# Patient Record
Sex: FEMALE | Race: WHITE | NOT HISPANIC OR LATINO | Employment: FULL TIME | URBAN - METROPOLITAN AREA
[De-identification: names, ages, dates, MRNs, and addresses within clinical notes are randomized per-mention and may not be internally consistent; named-entity substitution may affect disease eponyms.]

---

## 2017-02-26 ENCOUNTER — TRANSFERRED RECORDS (OUTPATIENT)
Dept: HEALTH INFORMATION MANAGEMENT | Facility: CLINIC | Age: 42
End: 2017-02-26

## 2017-02-28 ENCOUNTER — TELEPHONE (OUTPATIENT)
Dept: PEDIATRICS | Facility: CLINIC | Age: 42
End: 2017-02-28

## 2017-02-28 NOTE — TELEPHONE ENCOUNTER
Please call patient back, regarding her UC visit on 2/26/17 she need to talk to nurse about her meds and current condition. Please back at [phone].      Thank you,    Anjali Salas

## 2017-02-28 NOTE — TELEPHONE ENCOUNTER
Patient was seen in the urgency room on Sunday-has a swollen gland on left jaw.  Began antibiotic-Clindamycin 300 mg four times daily for 7 days- on Sunday.  When she eats, the swelling in the gland increases, and then reduces after eating, or with massaging.  Discomfort with swallowing.  Gland is tender to the touch.  Wondering if antibiotic treatment is appropriate?  MURRAY Tamez RN

## 2017-02-28 NOTE — TELEPHONE ENCOUNTER
Patient states understanding. She will call back if worsening or change in symptoms or no improvement.  Marita Darby RN

## 2017-02-28 NOTE — TELEPHONE ENCOUNTER
It may be that this is inflammation of the parotid (salivary) gland or a parotid stone.  Clindamycin is a reasonable abx treatment for infection.  I would say if she is not better by day 5-7 of abx or is getting worse- fevers, chills, swelling in other locations- then she would need to be seen.   Yvrose Cool MD

## 2017-09-03 ENCOUNTER — HEALTH MAINTENANCE LETTER (OUTPATIENT)
Age: 42
End: 2017-09-03

## 2017-09-21 ENCOUNTER — TRANSFERRED RECORDS (OUTPATIENT)
Dept: HEALTH INFORMATION MANAGEMENT | Facility: CLINIC | Age: 42
End: 2017-09-21

## 2018-01-24 ENCOUNTER — OFFICE VISIT (OUTPATIENT)
Dept: PEDIATRICS | Facility: CLINIC | Age: 43
End: 2018-01-24
Payer: COMMERCIAL

## 2018-01-24 VITALS
DIASTOLIC BLOOD PRESSURE: 74 MMHG | WEIGHT: 164.5 LBS | TEMPERATURE: 98.4 F | OXYGEN SATURATION: 99 % | SYSTOLIC BLOOD PRESSURE: 112 MMHG | HEIGHT: 61 IN | HEART RATE: 88 BPM | BODY MASS INDEX: 31.06 KG/M2

## 2018-01-24 DIAGNOSIS — Z13.6 CARDIOVASCULAR SCREENING; LDL GOAL LESS THAN 160: ICD-10-CM

## 2018-01-24 DIAGNOSIS — Z01.419 ENCOUNTER FOR GYNECOLOGICAL EXAMINATION WITHOUT ABNORMAL FINDING: Primary | ICD-10-CM

## 2018-01-24 DIAGNOSIS — J45.20 INTERMITTENT ASTHMA, UNCOMPLICATED: ICD-10-CM

## 2018-01-24 PROCEDURE — G0145 SCR C/V CYTO,THINLAYER,RESCR: HCPCS | Performed by: INTERNAL MEDICINE

## 2018-01-24 PROCEDURE — 99396 PREV VISIT EST AGE 40-64: CPT | Performed by: INTERNAL MEDICINE

## 2018-01-24 PROCEDURE — 87624 HPV HI-RISK TYP POOLED RSLT: CPT | Performed by: INTERNAL MEDICINE

## 2018-01-24 RX ORDER — ALBUTEROL SULFATE 90 UG/1
2 AEROSOL, METERED RESPIRATORY (INHALATION) EVERY 6 HOURS
Qty: 3 INHALER | Refills: 1 | Status: SHIPPED | OUTPATIENT
Start: 2018-01-24 | End: 2020-01-24

## 2018-01-24 NOTE — MR AVS SNAPSHOT
After Visit Summary   1/24/2018    Genny Reyes    MRN: [id number]           Patient Information     Date Of Birth          1975        Visit Information        Provider Department      1/24/2018 8:20 AM Yvrose Cool MD [facility] [place]        Today's Diagnoses     Encounter for gynecological examination without abnormal finding    -  1    Intermittent asthma, uncomplicated        CARDIOVASCULAR SCREENING; LDL GOAL LESS THAN 160          Care Instructions    Email/drop off health screening numbers and I will decide if we should do further testing.     Work on watching diet. Eat a healthy, balanced diet that consists of 1/2 plate of fruits and vegetables, 1/4 plate complex carbohydrate and 1/4 plate lean protein.     Follow up for annual care or as needed       Preventive Health Recommendations  Female Ages 40 to 49    Yearly exam:     See your health care provider every year in order to  1. Review health changes.   2. Discuss preventive care.    3. Review your medicines if your doctor prescribed any.      Get a Pap test every three years (unless you have an abnormal result and your provider advises testing more often).      If you get Pap tests with HPV test, you only need to test every 5 years, unless you have an abnormal result. You do not need a Pap test if your uterus was removed (hysterectomy) and you have not had cancer.      You should be tested each year for STDs (sexually transmitted diseases), if you're at risk.       Ask your doctor if you should have a mammogram.      Have a colonoscopy (test for colon cancer) if someone in your family has had colon cancer or polyps before age 50.       Have a cholesterol test every 5 years.       Have a diabetes test (fasting glucose) after age 45. If you are at risk for diabetes, you should have this test every 3 years.    Shots: Get a flu shot each year. Get a tetanus shot every 10 years.     Nutrition:     Eat at least 5  servings of fruits and vegetables each day.    Eat whole-grain bread, whole-wheat pasta and brown rice instead of white grains and rice.    Talk to your provider about Calcium and Vitamin D.     Lifestyle    Exercise at least 150 minutes a week (an average of 30 minutes a day, 5 days a week). This will help you control your weight and prevent disease.    Limit alcohol to one drink per day.    No smoking.     Wear sunscreen to prevent skin cancer.    See your dentist every six months for an exam and cleaning.          Follow-ups after your visit        Who to contact     If you have questions or need follow up information about today's clinic visit or your schedule please contact [facility] directly at [phone].  Normal or non-critical lab and imaging results will be communicated to you by [employer], letter or phone within 4 business days after the clinic has received the results. If you do not hear from us within 7 days, please contact the clinic through [employer] or phone. If you have a critical or abnormal lab result, we will notify you by phone as soon as possible.  Submit refill requests through [employer] or call your pharmacy and they will forward the refill request to us. Please allow 3 business days for your refill to be completed.          Additional Information About Your Visit        [employer] Information     [employer] gives you secure access to your electronic health record. If you see a primary care provider, you can also send messages to your care team and make appointments. If you have questions, please call your primary care clinic.  If you do not have a primary care provider, please call [phone] and they will assist you.        Care EveryWhere ID     This is your Care EveryWhere ID. This could be used by other organizations to access your [place] medical records  [id number]        Your Vitals Were     Pulse Temperature Height Pulse Oximetry BMI (Body Mass Index)       88 98.4  F (36.9  " C) (Oral) 5' 0.75\" (1.543 m) 99% 31.34 kg/m2        Blood Pressure from Last 3 Encounters:   01/24/18 112/74   06/16/16 112/76   03/02/16 100/72    Weight from Last 3 Encounters:   01/24/18 164 lb 8 oz (74.6 kg)   06/16/16 152 lb 4 oz (69.1 kg)   03/02/16 155 lb 7 oz (70.5 kg)              We Performed the Following     Asthma Action Plan (AAP)     HPV High Risk Types DNA Cervical     Pap imaged thin layer screen with HPV - recommended age 30 - 65          Where to get your medicines      These medications were sent to [facility]/pharmacy #[id number] - [place], [place] - [address] AT [place] [address], [place] [address]     Phone:  [phone]     albuterol 108 (90 BASE) MCG/ACT Inhaler          Primary Care Provider Office Phone # Fax #    Yvrose Cool [phone]       [facility] [address] [place] [address]        Equal Access to Services     [facility]: Hadii francisco j post Sojavier, waaxda luyvrose, qaybta kacey aguirre, geronimo levine . So [facility] [phone].    ATENCIÓN: Si habla español, tiene a yates disposición servicios gratuitos de asistencia lingüística. [facility] [phone].    We comply with applicable federal civil rights laws and [place] laws. We do not discriminate on the basis of race, color, national origin, age, disability, sex, sexual orientation, or gender identity.            Thank you!     Thank you for choosing [facility]  for your care. Our goal is always to provide you with excellent care. Hearing back from our patients is one way we can continue to improve our services. Please take a few minutes to complete the written survey that you may receive in the mail after your visit with us. Thank you!             Your Updated Medication List - Protect others around you: Learn how to safely use, store and throw away your medicines at www.disposemymeds.org.          This list is accurate as " of 1/24/18  9:10 AM.  Always use your most recent med list.                   Brand Name Dispense Instructions for use Diagnosis    albuterol 108 (90 BASE) MCG/ACT Inhaler    PROAIR HFA/PROVENTIL HFA/VENTOLIN HFA    3 Inhaler    Inhale 2 puffs into the lungs every 6 hours    Intermittent asthma, uncomplicated       E-Z SPACER Amber     1 Device    Use with inhaler.    Intermittent asthma       fluticasone 50 MCG/ACT spray    FLONASE    48 g    Spray 1-2 sprays into both nostrils daily    Environmental allergies       levonorgestrel 20 MCG/24HR IUD    MIRENA    1 each    1 each (20 mcg) by Intrauterine route once for 1 dose    Encounter for IUD insertion       ZYRTEC 10 MG tablet   Generic drug:  cetirizine      1 TABLET DAILY

## 2018-01-24 NOTE — NURSING NOTE
"Chief Complaint   Patient presents with     Physical       Initial /74 (BP Location: Right arm, Patient Position: Chair, Cuff Size: Adult Regular)  Pulse 88  Temp 98.4  F (36.9  C) (Oral)  Ht 5' 0.75\" (1.543 m)  Wt 164 lb 8 oz (74.6 kg)  SpO2 99%  BMI 31.34 kg/m2 Estimated body mass index is 31.34 kg/(m^2) as calculated from the following:    Height as of this encounter: 5' 0.75\" (1.543 m).    Weight as of this encounter: 164 lb 8 oz (74.6 kg).  Medication Reconciliation: complete   Beronica Juarez MA 8:49 AM 1/24/2018     "

## 2018-01-24 NOTE — PATIENT INSTRUCTIONS
Email/drop off health screening numbers and I will decide if we should do further testing.     Work on watching diet. Eat a healthy, balanced diet that consists of 1/2 plate of fruits and vegetables, 1/4 plate complex carbohydrate and 1/4 plate lean protein.     Follow up for annual care or as needed       Preventive Health Recommendations  Female Ages 40 to 49    Yearly exam:     See your health care provider every year in order to  1. Review health changes.   2. Discuss preventive care.    3. Review your medicines if your doctor prescribed any.      Get a Pap test every three years (unless you have an abnormal result and your provider advises testing more often).      If you get Pap tests with HPV test, you only need to test every 5 years, unless you have an abnormal result. You do not need a Pap test if your uterus was removed (hysterectomy) and you have not had cancer.      You should be tested each year for STDs (sexually transmitted diseases), if you're at risk.       Ask your doctor if you should have a mammogram.      Have a colonoscopy (test for colon cancer) if someone in your family has had colon cancer or polyps before age 50.       Have a cholesterol test every 5 years.       Have a diabetes test (fasting glucose) after age 45. If you are at risk for diabetes, you should have this test every 3 years.    Shots: Get a flu shot each year. Get a tetanus shot every 10 years.     Nutrition:     Eat at least 5 servings of fruits and vegetables each day.    Eat whole-grain bread, whole-wheat pasta and brown rice instead of white grains and rice.    Talk to your provider about Calcium and Vitamin D.     Lifestyle    Exercise at least 150 minutes a week (an average of 30 minutes a day, 5 days a week). This will help you control your weight and prevent disease.    Limit alcohol to one drink per day.    No smoking.     Wear sunscreen to prevent skin cancer.    See your dentist every six months for an exam and  cleaning.

## 2018-01-24 NOTE — PROGRESS NOTES
"   SUBJECTIVE:   CC: Genny Reyes is an 42 year old woman who presents for preventive health visit.     Genny presents to the clinic for her annual physical examination. Patient reports asthma is controlled; she uses albuterol inhaler \"almost never\". Notes only one asthma attack in the past year. Notes having blood work done for pj screening at work. BP in clinic was 112/74; weight was 164 lbs. Patient reports taking a J-term class and has been stressed and not exercising.       Physical   Annual:     Getting at least 3 servings of Calcium per day::  Yes    Bi-annual eye exam::  Yes    Dental care twice a year::  Yes    Sleep apnea or symptoms of sleep apnea::  Excessive snoring    Diet::  Low fat/cholesterol    Frequency of exercise::  4-5 days/week    Duration of exercise::  30-45 minutes    Taking medications regularly::  Yes    Medication side effects::  Not applicable    Additional concerns today::  No                    Today's PHQ-2 Score:   PHQ-2 ( 1999 Pfizer) 1/21/2018   Q1: Little interest or pleasure in doing things 0   Q2: Feeling down, depressed or hopeless 0   PHQ-2 Score 0   Q1: Little interest or pleasure in doing things Not at all   Q2: Feeling down, depressed or hopeless Not at all   PHQ-2 Score 0       Abuse: Current or Past(Physical, Sexual or Emotional)- No  Do you feel safe in your environment - Yes    Social History   Substance Use Topics     Smoking status: Never Smoker     Smokeless tobacco: Never Used     Alcohol use Yes      Comment: 1 drink per month     Alcohol Use 1/21/2018   If you drink alcohol, do you typically have greater than 3 drinks per day OR greater than 7 drinks per week?   No   No flowsheet data found.    Reviewed orders with patient.  Reviewed health maintenance and updated orders accordingly - Yes  Labs reviewed in EPIC  BP Readings from Last 3 Encounters:   01/24/18 112/74   06/16/16 112/76   03/02/16 100/72    Wt Readings from Last 3 Encounters:   01/24/18 74.6 " kg (164 lb 8 oz)   16 69.1 kg (152 lb 4 oz)   16 70.5 kg (155 lb 7 oz)                  Patient Active Problem List   Diagnosis     CARDIOVASCULAR SCREENING; LDL GOAL LESS THAN 160     Dysplastic nevus     Encounter for IUD insertion     Intermittent asthma, uncomplicated     Past Surgical History:   Procedure Laterality Date     CHOLECYSTECTOMY, LAPOROSCOPIC  2009     SURGICAL HISTORY OF -   ,            Social History   Substance Use Topics     Smoking status: Never Smoker     Smokeless tobacco: Never Used     Alcohol use Yes      Comment: 1 drink per month     Family History   Problem Relation Age of Onset     Lipids Mother      Lipids Father      skin cancer     Coronary Artery Disease Paternal Grandfather      5 heart attacks         Current Outpatient Prescriptions   Medication Sig Dispense Refill     albuterol (PROAIR HFA, PROVENTIL HFA, VENTOLIN HFA) 108 (90 BASE) MCG/ACT inhaler Inhale 2 puffs into the lungs every 6 hours 3 Inhaler 1     fluticasone (FLONASE) 50 MCG/ACT nasal spray Spray 1-2 sprays into both nostrils daily 48 g 3     Spacer/Aero-Holding Chambers (E-Z SPACER) BLESSING Use with inhaler. 1 Device 0     ZYRTEC 10 MG OR TABS 1 TABLET DAILY       levonorgestrel (MIRENA) 20 MCG/24HR IUD 1 each (20 mcg) by Intrauterine route once for 1 dose 1 each 0     Allergies   Allergen Reactions     Cats      Kiwi      Penicillins      Pomegranate Extract      fruit     Seasonal Allergies        Patient under age 50, mutual decision reflected in health maintenance.      Pertinent mammograms are reviewed under the imaging tab.  History of abnormal Pap smear:   Last 3 Pap Results:   PAP (no units)   Date Value   2013 NIL   2010 NIL       Reviewed and updated as needed this visit by clinical staff         Reviewed and updated as needed this visit by Provider            Review of Systems  C: NEGATIVE for fever, chills, change in weight  I: NEGATIVE for worrisome rashes, moles  "or lesions  E: NEGATIVE for vision changes or irritation  ENT: NEGATIVE for ear, mouth and throat problems  R: NEGATIVE for significant cough or SOB  B: NEGATIVE for masses, tenderness or discharge  CV: NEGATIVE for chest pain, palpitations or peripheral edema  GI: NEGATIVE for nausea, abdominal pain, heartburn, or change in bowel habits  : NEGATIVE for unusual urinary or vaginal symptoms. Periods are regular.  M: NEGATIVE for significant arthralgias or myalgia  N: NEGATIVE for weakness, dizziness or paresthesias  P: NEGATIVE for changes in mood or affect     This document serves as a record of the services and decisions personally performed and made by Yvrose Cool MD. It was created on her behalf by Sofía Casarez, a trained medical scribe. The creation of this document is based the provider's statements to the medical scribe.    Sofía Casarez January 24, 2018 9:04 AM  OBJECTIVE:   /74 (BP Location: Right arm, Patient Position: Chair, Cuff Size: Adult Regular)  Pulse 88  Temp 98.4  F (36.9  C) (Oral)  Ht 5' 0.75\" (1.543 m)  Wt 164 lb 8 oz (74.6 kg)  SpO2 99%  BMI 31.34 kg/m2  Physical Exam  GENERAL: healthy, alert and no distress  EYES: Eyes grossly normal to inspection, PERRL and conjunctivae and sclerae normal  HENT: ear canals and TM's normal, nose and mouth without ulcers or lesions  NECK: no adenopathy, no asymmetry, masses, or scars and thyroid normal to palpation  RESP: lungs clear to auscultation - no rales, rhonchi or wheezes  BREAST: normal without masses, tenderness or nipple discharge and no palpable axillary masses or adenopathy  CV: regular rate and rhythm, normal S1 S2, no S3 or S4, no murmur, click or rub, no peripheral edema   ABDOMEN: soft, nontender, no hepatosplenomegaly, no masses and bowel sounds normal   (female): normal female external genitalia, normal urethral meatus, vaginal mucosa pink, moist, well rugated, and normal cervix/adnexa/uterus without masses or " "discharge  MS: no gross musculoskeletal defects noted, no edema  SKIN: no suspicious lesions or rashes  NEURO: Normal strength and tone, mentation intact and speech normal  PSYCH: mentation appears normal, affect normal/bright    ASSESSMENT/PLAN:   (Z01.419) Encounter for gynecological examination without abnormal finding  (primary encounter diagnosis)  -- immunizations utd   -- ACT today   -- pap today   -- discussed healthy diet and exercise; guidelines provided       (J45.20) Intermittent asthma, uncomplicated  -- asthma well controlled   -- continue with current care plan   Plan: albuterol (PROAIR HFA/PROVENTIL HFA/VENTOLIN         HFA) 108 (90 BASE) MCG/ACT Inhaler      (Z13.6) CARDIOVASCULAR SCREENING; LDL GOAL LESS THAN 160  -- patient to send me health screening numbers; will determine blood work after     Follow up for annual care or as needed     COUNSELING:  Reviewed preventive health counseling, as reflected in patient instructions       Regular exercise       Healthy diet/nutrition         reports that she has never smoked. She has never used smokeless tobacco.    Estimated body mass index is 28.53 kg/(m^2) as calculated from the following:    Height as of 6/16/16: 5' 1.25\" (1.556 m).    Weight as of 6/16/16: 152 lb 4 oz (69.1 kg).   Weight management plan: Discussed healthy diet and exercise guidelines and patient will follow up in 12 months in clinic to re-evaluate.    Counseling Resources:  ATP IV Guidelines  Pooled Cohorts Equation Calculator  Breast Cancer Risk Calculator  FRAX Risk Assessment  ICSI Preventive Guidelines  Dietary Guidelines for Americans, 2010  USDA's MyPlate  ASA Prophylaxis  Lung CA Screening    The information in this document, created by the medical scribe for me, accurately reflects the services I personally performed and the decisions made by me. I have reviewed and approved this document for accuracy prior to leaving the patient care area.  Yvrose Cool, " MD  [facility] [place]

## 2018-01-24 NOTE — LETTER
My Asthma Action Plan  Name: Genny Reyes   YOB: 1975  Date: 1/24/2018   My doctor: Yvrose Cool MD   My clinic: [facility]        My Control Medicine: None  My Rescue Medicine: Albuterol (Proair/Ventolin/Proventil) inhaler 2 puffs prn  EZspace inhalor use with inhalor   My Asthma Severity: intermittent  Avoid your asthma triggers: dust mites and animal dander               GREEN ZONE   Good Control    I feel good    No cough or wheeze    Can work, sleep and play without asthma symptoms       Take your asthma control medicine every day.     1. If exercise triggers your asthma, take your rescue medication    15 minutes before exercise or sports, and    During exercise if you have asthma symptoms  2. Spacer to use with inhaler: If you have a spacer, make sure to use it with your inhaler             YELLOW ZONE Getting Worse  I have ANY of these:    I do not feel good    Cough or wheeze    Chest feels tight    Wake up at night   1. Keep taking your Green Zone medications  2. Start taking your rescue medicine:    every 20 minutes for up to 1 hour. Then every 4 hours for 24-48 hours.  3. If you stay in the Yellow Zone for more than 12-24 hours, contact your doctor.  4. If you do not return to the Green Zone in 12-24 hours or you get worse, start taking your oral steroid medicine if prescribed by your provider.           RED ZONE Medical Alert - Get Help  I have ANY of these:    I feel awful    Medicine is not helping    Breathing getting harder    Trouble walking or talking    Nose opens wide to breathe       1. Take your rescue medicine NOW  2. If your provider has prescribed an oral steroid medicine, start taking it NOW  3. Call your doctor NOW  4. If you are still in the Red Zone after 20 minutes and you have not reached your doctor:    Take your rescue medicine again and    Call 911 or go to the emergency room right away    See your regular doctor within 2 weeks of an  Emergency Room or Urgent Care visit for follow-up treatment.        Electronically signed by: Beronica Juarez, January 24, 2018    Annual Reminders:  Meet with Asthma Educator,  Flu Shot in the Fall, consider Pneumonia Vaccination for patients with asthma (aged 19 and older).    Pharmacy:    [facility] [place] [facility]  [facility] - [facility] [address] [facility] [address] [facility], [place] - [address] [facility]ACY #[id number] - [place], [place] - [address] AT [address] OF \[facility]\""                    Asthma Triggers  How To Control Things That Make Your Asthma Worse    Triggers are things that make your asthma worse.  Look at the list below to help you find your triggers and what you can do about them.  You can help prevent asthma flare-ups by staying away from your triggers.      Trigger                                                          What you can do   Cigarette Smoke  Tobacco smoke can make asthma worse. Do not allow smoking in your home, car or around you.  Be sure no one smokes at a child s day care or school.  If you smoke, ask your health care provider for ways to help you quit.  Ask family members to quit too.  Ask your health care provider for a referral to Quit Plan to help you quit smoking, or call 6-633-605-PLAN.     Colds, Flu, Bronchitis  These are common triggers of asthma. Wash your hands often.  Don t touch your eyes, nose or mouth.  Get a flu shot every year.     Dust Mites  These are tiny bugs that live in cloth or carpet. They are too small to see. Wash sheets and blankets in hot water every week.   Encase pillows and mattress in dust mite proof covers.  Avoid having carpet if you can. If you have carpet, vacuum weekly.   Use a dust mask and HEPA vacuum.   Pollen and Outdoor Mold  Some people are allergic to trees, grass, or weed pollen, or molds. Try to keep your windows closed.  Limit time out doors when pollen count is high.   Ask you health  care provider about taking medicine during allergy season.     Animal Dander  Some people are allergic to skin flakes, urine or saliva from pets with fur or feathers. Keep pets with fur or feathers out of your home.    If you can t keep the pet outdoors, then keep the pet out of your bedroom.  Keep the bedroom door closed.  Keep pets off cloth furniture and away from stuffed toys.     Mice, Rats, and Cockroaches  Some people are allergic to the waste from these pests.   Cover food and garbage.  Clean up spills and food crumbs.  Store grease in the refrigerator.   Keep food out of the bedroom.   Indoor Mold  This can be a trigger if your home has high moisture. Fix leaking faucets, pipes, or other sources of water.   Clean moldy surfaces.  Dehumidify basement if it is damp and smelly.   Smoke, Strong Odors, and Sprays  These can reduce air quality. Stay away from strong odors and sprays, such as perfume, powder, hair spray, paints, smoke incense, paint, cleaning products, candles and new carpet.   Exercise or Sports  Some people with asthma have this trigger. Be active!  Ask your doctor about taking medicine before sports or exercise to prevent symptoms.    Warm up for 5-10 minutes before and after sports or exercise.     Other Triggers of Asthma  Cold air:  Cover your nose and mouth with a scarf.  Sometimes laughing or crying can be a trigger.  Some medicines and food can trigger asthma.

## 2018-01-25 ASSESSMENT — ASTHMA QUESTIONNAIRES: ACT_TOTALSCORE: 25

## 2018-01-26 LAB
COPATH REPORT: NORMAL
PAP: NORMAL

## 2018-01-29 LAB
FINAL DIAGNOSIS: NORMAL
HPV HR 12 DNA CVX QL NAA+PROBE: NEGATIVE
HPV16 DNA SPEC QL NAA+PROBE: NEGATIVE
HPV18 DNA SPEC QL NAA+PROBE: NEGATIVE
SPECIMEN DESCRIPTION: NORMAL
SPECIMEN SOURCE CVX/VAG CYTO: NORMAL

## 2019-01-30 ENCOUNTER — TRANSFERRED RECORDS (OUTPATIENT)
Dept: HEALTH INFORMATION MANAGEMENT | Facility: CLINIC | Age: 44
End: 2019-01-30

## 2019-11-07 ENCOUNTER — HEALTH MAINTENANCE LETTER (OUTPATIENT)
Age: 44
End: 2019-11-07

## 2020-01-24 ENCOUNTER — OFFICE VISIT (OUTPATIENT)
Dept: PEDIATRICS | Facility: CLINIC | Age: 45
End: 2020-01-24
Payer: COMMERCIAL

## 2020-01-24 VITALS
DIASTOLIC BLOOD PRESSURE: 60 MMHG | HEART RATE: 68 BPM | TEMPERATURE: 98 F | RESPIRATION RATE: 16 BRPM | BODY MASS INDEX: 29.44 KG/M2 | HEIGHT: 62 IN | SYSTOLIC BLOOD PRESSURE: 100 MMHG | WEIGHT: 160 LBS

## 2020-01-24 DIAGNOSIS — Z00.00 ROUTINE GENERAL MEDICAL EXAMINATION AT A HEALTH CARE FACILITY: Primary | ICD-10-CM

## 2020-01-24 DIAGNOSIS — Z97.5 IUD (INTRAUTERINE DEVICE) IN PLACE: ICD-10-CM

## 2020-01-24 DIAGNOSIS — J45.20 INTERMITTENT ASTHMA, UNCOMPLICATED: ICD-10-CM

## 2020-01-24 LAB
CHOLEST SERPL-MCNC: 202 MG/DL
HDLC SERPL-MCNC: 65 MG/DL
LDLC SERPL CALC-MCNC: 120 MG/DL
NONHDLC SERPL-MCNC: 137 MG/DL
TRIGL SERPL-MCNC: 85 MG/DL

## 2020-01-24 PROCEDURE — 36415 COLL VENOUS BLD VENIPUNCTURE: CPT | Performed by: NURSE PRACTITIONER

## 2020-01-24 PROCEDURE — 99396 PREV VISIT EST AGE 40-64: CPT | Performed by: NURSE PRACTITIONER

## 2020-01-24 PROCEDURE — 80061 LIPID PANEL: CPT | Performed by: NURSE PRACTITIONER

## 2020-01-24 RX ORDER — ALBUTEROL SULFATE 90 UG/1
2 AEROSOL, METERED RESPIRATORY (INHALATION) EVERY 6 HOURS
Qty: 2 INHALER | Refills: 1 | Status: SHIPPED | OUTPATIENT
Start: 2020-01-24 | End: 2022-04-27

## 2020-01-24 ASSESSMENT — MIFFLIN-ST. JEOR: SCORE: 1326.07

## 2020-01-24 NOTE — PATIENT INSTRUCTIONS
Preventive Health Recommendations  Female Ages 40 to 49    Yearly exam:     See your health care provider every year in order to  1. Review health changes.   2. Discuss preventive care.    3. Review your medicines if your doctor prescribed any.      Get a Pap test every three years (unless you have an abnormal result and your provider advises testing more often).      If you get Pap tests with HPV test, you only need to test every 5 years, unless you have an abnormal result. You do not need a Pap test if your uterus was removed (hysterectomy) and you have not had cancer.      You should be tested each year for STDs (sexually transmitted diseases), if you're at risk.     Ask your doctor if you should have a mammogram.      Have a colonoscopy (test for colon cancer) if someone in your family has had colon cancer or polyps before age 50.       Have a cholesterol test every 5 years.       Have a diabetes test (fasting glucose) after age 45. If you are at risk for diabetes, you should have this test every 3 years.    Shots: Get a flu shot each year. Get a tetanus shot every 10 years.     Nutrition:     Eat at least 5 servings of fruits and vegetables each day.    Eat whole-grain bread, whole-wheat pasta and brown rice instead of white grains and rice.    Get adequate Calcium and Vitamin D.      Lifestyle    Exercise at least 150 minutes a week (an average of 30 minutes a day, 5 days a week). This will help you control your weight and prevent disease.    Limit alcohol to one drink per day.    No smoking.     Wear sunscreen to prevent skin cancer.    See your dentist every six months for an exam and cleaning.

## 2020-01-24 NOTE — PROGRESS NOTES
SUBJECTIVE:   CC: Genny Reyes is an 44 year old woman who presents for preventive health visit.     Was initially here for iud replacement, has been in place x 5 yrs, but overdue for physical. She bleeds infrequently.     Today's PHQ-2 Score:   PHQ-2 ( 1999 Pfizer) 1/21/2020   Q1: Little interest or pleasure in doing things 0   Q2: Feeling down, depressed or hopeless 0   PHQ-2 Score 0   Q1: Little interest or pleasure in doing things Not at all   Q2: Feeling down, depressed or hopeless Not at all   PHQ-2 Score 0     Abuse: Current or Past(Physical, Sexual or Emotional)- No  Do you feel safe in your environment? yes      Social History     Tobacco Use     Smoking status: Never Smoker     Smokeless tobacco: Never Used   Substance Use Topics     Alcohol use: Yes     Comment: 1 drink per month     If you drink alcohol do you typically have >3 drinks per day or >7 drinks per week? No      Reviewed orders with patient.  Reviewed health maintenance and updated orders accordingly - Yes  Lab work is in process    Mammogram Screening: Patient under age 50, mutual decision reflected in health maintenance.      Pertinent mammograms are reviewed under the imaging tab.  History of abnormal Pap smear: NO - age 30-65 PAP every 5 years with negative HPV co-testing recommended  PAP / HPV Latest Ref Rng & Units 1/24/2018 11/1/2013 11/3/2010   PAP - NIL NIL NIL   HPV 16 DNA NEG:Negative Negative - -   HPV 18 DNA NEG:Negative Negative - -   OTHER HR HPV NEG:Negative Negative - -     Reviewed and updated as needed this visit by clinical staff  Tobacco  Allergies  Meds  Fam Hx  Soc Hx        Reviewed and updated as needed this visit by Provider  Meds            Review of Systems  CONSTITUTIONAL: NEGATIVE for fever, chills, change in weight  INTEGUMENTARY/SKIN: NEGATIVE for worrisome rashes, moles or lesions  EYES: NEGATIVE for vision changes or irritation  ENT: NEGATIVE for ear, mouth and throat problems  RESP: NEGATIVE for  "significant cough or SOB  BREAST: NEGATIVE for masses, tenderness or discharge  CV: NEGATIVE for chest pain, palpitations or peripheral edema  GI: NEGATIVE for nausea, abdominal pain, heartburn, or change in bowel habits  : NEGATIVE for unusual urinary or vaginal symptoms. No vaginal bleeding.  MUSCULOSKELETAL: NEGATIVE for significant arthralgias or myalgia  NEURO: NEGATIVE for weakness, dizziness or paresthesias  PSYCHIATRIC: NEGATIVE for changes in mood or affect      OBJECTIVE:   /60 (BP Location: Right arm, Patient Position: Sitting, Cuff Size: Adult Regular)   Pulse 68   Temp 98  F (36.7  C) (Tympanic)   Resp 16   Ht 5' 1.5\" (1.562 m)   Wt 160 lb (72.6 kg)   BMI 29.74 kg/m    Physical Exam  GENERAL: healthy, alert and no distress  EYES: Eyes grossly normal to inspection, PERRL and conjunctivae and sclerae normal  HENT: ear canals and TM's normal, nose and mouth without ulcers or lesions  NECK: no adenopathy, no asymmetry, masses, or scars and thyroid normal to palpation  RESP: lungs clear to auscultation - no rales, rhonchi or wheezes  CV: regular rate and rhythm, normal S1 S2, no S3 or S4, no murmur, click or rub, no peripheral edema and peripheral pulses strong  ABDOMEN: soft, nontender, no hepatosplenomegaly, no masses and bowel sounds normal  MS: no gross musculoskeletal defects noted, no edema  PSYCH: mentation appears normal, affect normal/bright      ASSESSMENT/PLAN:   1. Routine general medical examination at a health care facility    - Lipid Profile    2. IUD (intrauterine device) in place  Will keep in for 2 more years for a total of 7 yrs, then have it replaced and that will likely last through menopause.     3. Intermittent asthma, uncomplicated  stable  - albuterol (PROAIR HFA/PROVENTIL HFA/VENTOLIN HFA) 108 (90 Base) MCG/ACT inhaler; Inhale 2 puffs into the lungs every 6 hours  Dispense: 2 Inhaler; Refill: 1    COUNSELING:  Reviewed preventive health counseling, as reflected in " "patient instructions  Special attention given to:        Regular exercise       Healthy diet/nutrition       Contraception       Family planning       Osteoporosis Prevention/Bone Health    Estimated body mass index is 29.74 kg/m  as calculated from the following:    Height as of this encounter: 5' 1.5\" (1.562 m).    Weight as of this encounter: 160 lb (72.6 kg).    Weight management plan: Discussed healthy diet and exercise guidelines     reports that she has never smoked. She has never used smokeless tobacco.      Counseling Resources:  ATP IV Guidelines  Pooled Cohorts Equation Calculator  Breast Cancer Risk Calculator  FRAX Risk Assessment  ICSI Preventive Guidelines  Dietary Guidelines for Americans, 2010  [employer] MyPlate  ASA Prophylaxis  Lung CA Screening    GRISEL [facility] JENNIFER  Answers for HPI/ROS submitted by the patient on 1/21/2020   Chronic problems general questions HPI Form  How many servings of fruits and vegetables do you eat daily?: 4 or more  On average, how many sweetened beverages do you drink each day (Examples: soda, juice, sweet tea, etc.  Do NOT count diet or artificially sweetened beverages)?: 0  How many minutes a day do you exercise enough to make your heart beat faster?: 30 to 60  How many days a week do you exercise enough to make your heart beat faster?: 6  How many days per week do you miss taking your medication?: 0    "

## 2020-01-28 ENCOUNTER — TELEPHONE (OUTPATIENT)
Dept: PEDIATRICS | Facility: CLINIC | Age: 45
End: 2020-01-28

## 2020-01-28 NOTE — TELEPHONE ENCOUNTER
Panel Management Review          Composite cancer screening  Chart review shows that this patient is due/due soon for the following None  Summary:    Patient is due/failing the following:   ACT    Action needed:   Patient needs to do ACT.    Type of outreach:    Copy of ACT mailed to patient, will reach out in 5 days.    Questions for provider review:    None                                                                                                                                    Antonina Rey CMA([facility])

## 2020-01-28 NOTE — LETTER
January 28, 2020      Genny Reyes  [address]  [place] [address]        Dear Genny,       We care about your health and have reviewed your health plan including your medical conditions, medications, and lab results.  Based on this review, it is recommended that you follow up regarding the following health topic(s):  -Asthma    We recommend you take the following action(s):   -Complete and return the attached ASTHMA CONTROL TEST.  If your total score is 19 or less or you have been to the ER or urgent care for your asthma, then please schedule an asthma followup appointment.     Please call us at the [facility] - [phone] (or use [employer]) to address the above recommendations.     Thank you for trusting [facility] and we appreciate the opportunity to serve you.  We look forward to supporting your healthcare needs in the future.    Healthy Regards,    Your Health Care Team  [facility]

## 2020-02-07 ASSESSMENT — ASTHMA QUESTIONNAIRES: ACT_TOTALSCORE: 25

## 2020-06-24 ENCOUNTER — ALLIED HEALTH/NURSE VISIT (OUTPATIENT)
Dept: NURSING | Facility: CLINIC | Age: 45
End: 2020-06-24
Payer: COMMERCIAL

## 2020-06-24 DIAGNOSIS — Z23 NEED FOR VACCINATION: Primary | ICD-10-CM

## 2020-06-24 PROCEDURE — 90471 IMMUNIZATION ADMIN: CPT

## 2020-06-24 PROCEDURE — 90715 TDAP VACCINE 7 YRS/> IM: CPT

## 2020-06-24 NOTE — PROGRESS NOTES
Prior to immunization administration, verified patients identity using patient s name and date of birth. Please see Immunization Activity for additional information.     Screening Questionnaire for Adult Immunization    Are you sick today?   No   Do you have allergies to medications, food, a vaccine component or latex?   Yes   Have you ever had a serious reaction after receiving a vaccination?   No   Do you have a long-term health problem with heart, lung, kidney, or metabolic disease (e.g., diabetes), asthma, a blood disorder, no spleen, complement component deficiency, a cochlear implant, or a spinal fluid leak?  Are you on long-term aspirin therapy?   No   Do you have cancer, leukemia, HIV/AIDS, or any other immune system problem?   No   Do you have a parent, brother, or sister with an immune system problem?   No   In the past 3 months, have you taken medications that affect  your immune system, such as prednisone, other steroids, or anticancer drugs; drugs for the treatment of rheumatoid arthritis, Crohn s disease, or psoriasis; or have you had radiation treatments?   No   Have you had a seizure, or a brain or other nervous system problem?   No   During the past year, have you received a transfusion of blood or blood    products, or been given immune (gamma) globulin or antiviral drug?   No   For women: Are you pregnant or is there a chance you could become       pregnant during the next month?   No   Have you received any vaccinations in the past 4 weeks?   No     Immunization questionnaire answers were all negative.        Per orders of GRISEL Gusman, injection of TDAP given by Yamilex Garsia MA. Patient instructed to remain in clinic for 15 minutes afterwards, and to report any adverse reaction to me immediately.       Screening performed by Yamilex Garsia MA on 6/24/2020 at 11:10 AM.

## 2020-09-09 ENCOUNTER — TRANSFERRED RECORDS (OUTPATIENT)
Dept: HEALTH INFORMATION MANAGEMENT | Facility: CLINIC | Age: 45
End: 2020-09-09

## 2020-11-09 ENCOUNTER — ALLIED HEALTH/NURSE VISIT (OUTPATIENT)
Dept: PEDIATRICS | Facility: CLINIC | Age: 45
End: 2020-11-09
Payer: COMMERCIAL

## 2020-11-09 DIAGNOSIS — Z23 NEED FOR PROPHYLACTIC VACCINATION AND INOCULATION AGAINST INFLUENZA: Primary | ICD-10-CM

## 2020-11-09 PROCEDURE — 90686 IIV4 VACC NO PRSV 0.5 ML IM: CPT

## 2020-11-09 PROCEDURE — 90471 IMMUNIZATION ADMIN: CPT

## 2020-11-09 PROCEDURE — 99207 PR NO CHARGE NURSE ONLY: CPT

## 2021-04-11 ENCOUNTER — HEALTH MAINTENANCE LETTER (OUTPATIENT)
Age: 46
End: 2021-04-11

## 2021-04-15 ENCOUNTER — OFFICE VISIT (OUTPATIENT)
Dept: PEDIATRICS | Facility: CLINIC | Age: 46
End: 2021-04-15
Payer: COMMERCIAL

## 2021-04-15 VITALS
WEIGHT: 161.6 LBS | BODY MASS INDEX: 30.51 KG/M2 | RESPIRATION RATE: 16 BRPM | SYSTOLIC BLOOD PRESSURE: 124 MMHG | HEART RATE: 77 BPM | OXYGEN SATURATION: 98 % | TEMPERATURE: 98.4 F | HEIGHT: 61 IN | DIASTOLIC BLOOD PRESSURE: 82 MMHG

## 2021-04-15 DIAGNOSIS — Z11.59 NEED FOR HEPATITIS C SCREENING TEST: ICD-10-CM

## 2021-04-15 DIAGNOSIS — Z11.4 SCREENING FOR HIV (HUMAN IMMUNODEFICIENCY VIRUS): ICD-10-CM

## 2021-04-15 DIAGNOSIS — Z30.430 ENCOUNTER FOR IUD INSERTION: ICD-10-CM

## 2021-04-15 DIAGNOSIS — J45.20 INTERMITTENT ASTHMA, UNCOMPLICATED: ICD-10-CM

## 2021-04-15 DIAGNOSIS — Z00.00 ROUTINE GENERAL MEDICAL EXAMINATION AT A HEALTH CARE FACILITY: Primary | ICD-10-CM

## 2021-04-15 PROCEDURE — 36415 COLL VENOUS BLD VENIPUNCTURE: CPT | Performed by: NURSE PRACTITIONER

## 2021-04-15 PROCEDURE — 86803 HEPATITIS C AB TEST: CPT | Performed by: NURSE PRACTITIONER

## 2021-04-15 PROCEDURE — 99396 PREV VISIT EST AGE 40-64: CPT | Performed by: NURSE PRACTITIONER

## 2021-04-15 ASSESSMENT — MIFFLIN-ST. JEOR: SCORE: 1320.39

## 2021-04-15 NOTE — PROGRESS NOTES
SUBJECTIVE:   CC: Genny Reyes is an 45 year old woman who presents for preventive health visit.     Patient has been advised of split billing requirements and indicates understanding: Yes  Healthy Habits:     Getting at least 3 servings of Calcium per day:  NO    Bi-annual eye exam:  Yes    Dental care twice a year:  Yes    Sleep apnea or symptoms of sleep apnea:  Excessive snoring    Diet:  Regular (no restrictions) and Low fat/cholesterol    Frequency of exercise:  6-7 days/week    Duration of exercise:  Greater than 60 minutes    Taking medications regularly:  0    Barriers to taking medications:  Not applicable    Medication side effects:  Not applicable    PHQ-2 Total Score: 0    Additional concerns today:  No  History of Present Illness       She eats 2-3 servings of fruits and vegetables daily.She consumes 1 sweetened beverage(s) daily.She exercises with enough effort to increase her heart rate 20 to 29 minutes per day.  She exercises with enough effort to increase her heart rate 5 days per week.   She is taking medications regularly.  She is not taking prescribed medications regularly due to Not applicable.    Works as a  from home.     History of intermittent asthma, doing well.     Wt Readings from Last 4 Encounters:   04/15/21 73.3 kg (161 lb 9.6 oz)   01/24/20 72.6 kg (160 lb)   01/24/18 74.6 kg (164 lb 8 oz)   06/16/16 69.1 kg (152 lb 4 oz)       Today's PHQ-2 Score:   PHQ-2 ( 1999 Pfizer) 4/12/2021   Q1: Little interest or pleasure in doing things 0   Q2: Feeling down, depressed or hopeless 0   PHQ-2 Score 0   Q1: Little interest or pleasure in doing things Not at all   Q2: Feeling down, depressed or hopeless Not at all   PHQ-2 Score 0       Abuse: Current or Past (Physical, Sexual or Emotional) - No  Do you feel safe in your environment? Yes    Have you ever done Advance Care Planning? (For example, a Health Directive, POLST, or a discussion with a medical provider or your loved ones  about your wishes): No, advance care planning information given to patient to review.  Patient plans to discuss their wishes with loved ones or provider.      Social History     Tobacco Use     Smoking status: Never Smoker     Smokeless tobacco: Never Used   Substance Use Topics     Alcohol use: Yes     Comment: 1 drink per month     If you drink alcohol do you typically have >3 drinks per day or >7 drinks per week? No    No flowsheet data found.    Reviewed orders with patient.  Reviewed health maintenance and updated orders accordingly - Yes  Lab work is in process    Breast Cancer Screening:  Any new diagnosis of family breast, ovarian, or bowel cancer? No    FSH-7: No flowsheet data found.    Mammogram Screening - Offered annual screening and updated Health Maintenance for mutual plan based on risk factor consideration    Pertinent mammograms are reviewed under the imaging tab.    History of abnormal Pap smear: NO - age 30-65 PAP every 5 years with negative HPV co-testing recommended  PAP / HPV Latest Ref Rng & Units 1/24/2018 11/1/2013 11/3/2010   PAP - NIL NIL NIL   HPV 16 DNA NEG:Negative Negative - -   HPV 18 DNA NEG:Negative Negative - -   OTHER HR HPV NEG:Negative Negative - -     Reviewed and updated as needed this visit by clinical staff  Tobacco  Allergies  Meds              Reviewed and updated as needed this visit by Provider    Meds                 Review of Systems  CONSTITUTIONAL: NEGATIVE for fever, chills, change in weight  INTEGUMENTARU/SKIN: NEGATIVE for worrisome rashes, moles or lesions  EYES: NEGATIVE for vision changes or irritation  ENT: NEGATIVE for ear, mouth and throat problems  RESP: NEGATIVE for significant cough or SOB  BREAST: NEGATIVE for masses, tenderness or discharge  CV: NEGATIVE for chest pain, palpitations or peripheral edema  GI: NEGATIVE for nausea, abdominal pain, heartburn, or change in bowel habits  : NEGATIVE for unusual urinary or vaginal symptoms. Periods are  "regular.  MUSCULOSKELETAL: NEGATIVE for significant arthralgias or myalgia  NEURO: NEGATIVE for weakness, dizziness or paresthesias  PSYCHIATRIC: NEGATIVE for changes in mood or affect     OBJECTIVE:   /82 (BP Location: Right arm, Patient Position: Chair, Cuff Size: Adult Regular)   Pulse 77   Temp 98.4  F (36.9  C) (Tympanic)   Resp 16   Ht 1.549 m (5' 1\")   Wt 73.3 kg (161 lb 9.6 oz)   SpO2 98%   BMI 30.53 kg/m    Physical Exam  GENERAL: healthy, alert and no distress  EYES: Eyes grossly normal to inspection, PERRL and conjunctivae and sclerae normal  HENT: ear canals and TM's normal, nose and mouth without ulcers or lesions  NECK: no adenopathy, no asymmetry, masses, or scars and thyroid normal to palpation  RESP: lungs clear to auscultation - no rales, rhonchi or wheezes  CV: regular rate and rhythm, normal S1 S2, no S3 or S4, no murmur, click or rub, no peripheral edema and peripheral pulses strong  ABDOMEN: soft, nontender, no hepatosplenomegaly, no masses and bowel sounds normal  MS: no gross musculoskeletal defects noted, no edema  PSYCH: mentation appears normal, affect normal/bright      ASSESSMENT/PLAN:   1. Routine general medical examination at a health care facility    - *MA Screening Digital Bilateral; Future    2. Screening for HIV (human immunodeficiency virus)      3. Need for hepatitis C screening test    - Hepatitis C Screen Reflex to HCV RNA Quant and Genotype    4. Intermittent asthma, uncomplicated  stable    5. Encounter for IUD insertion        Patient has been advised of split billing requirements and indicates understanding: No  COUNSELING:  Reviewed preventive health counseling, as reflected in patient instructions  Special attention given to:        Regular exercise       Healthy diet/nutrition       Contraception       Family planning       Osteoporosis prevention/bone health       Safe sex practices/STD prevention    Estimated body mass index is 30.53 kg/m  as calculated " "from the following:    Height as of this encounter: 1.549 m (5' 1\").    Weight as of this encounter: 73.3 kg (161 lb 9.6 oz).    Weight management plan: Discussed healthy diet and exercise guidelines    She reports that she has never smoked. She has never used smokeless tobacco.      Counseling Resources:  ATP IV Guidelines  Pooled Cohorts Equation Calculator  Breast Cancer Risk Calculator  BRCA-Related Cancer Risk Assessment: FHS-7 Tool  FRAX Risk Assessment  ICSI Preventive Guidelines  Dietary Guidelines for Americans, 2010  USDA's MyPlate  ASA Prophylaxis  Lung CA Screening    Elizabeth Cerda, [facility] JENNIFER  "

## 2021-04-16 LAB — HCV AB SERPL QL IA: NONREACTIVE

## 2021-04-16 ASSESSMENT — ASTHMA QUESTIONNAIRES: ACT_TOTALSCORE: 25

## 2021-05-03 ENCOUNTER — OFFICE VISIT (OUTPATIENT)
Dept: PEDIATRICS | Facility: CLINIC | Age: 46
End: 2021-05-03
Payer: COMMERCIAL

## 2021-05-03 VITALS
DIASTOLIC BLOOD PRESSURE: 72 MMHG | HEIGHT: 61 IN | BODY MASS INDEX: 30.62 KG/M2 | WEIGHT: 162.2 LBS | OXYGEN SATURATION: 98 % | TEMPERATURE: 98.7 F | SYSTOLIC BLOOD PRESSURE: 116 MMHG | RESPIRATION RATE: 14 BRPM | HEART RATE: 80 BPM

## 2021-05-03 DIAGNOSIS — Z30.433 ENCOUNTER FOR REMOVAL AND REINSERTION OF INTRAUTERINE CONTRACEPTIVE DEVICE (IUD): Primary | ICD-10-CM

## 2021-05-03 PROCEDURE — 58301 REMOVE INTRAUTERINE DEVICE: CPT | Performed by: NURSE PRACTITIONER

## 2021-05-03 PROCEDURE — 58300 INSERT INTRAUTERINE DEVICE: CPT | Performed by: NURSE PRACTITIONER

## 2021-05-03 ASSESSMENT — MIFFLIN-ST. JEOR: SCORE: 1327.07

## 2021-05-03 NOTE — PROGRESS NOTES
Chief Complaint/ History of Present Illness:Genny Reyes is a 45 year old female.   No LMP recorded. (Menstrual status: IUD)..  A pregnancy test was not done today.  She is currently using IUD for birth control.   A chlamydia/gonorrhea test was was not done today because of not needed  She is here for an IUD insertion.  Patient has been given written information.  I have reviewed the risks of the IUD including pregnancy, PID, life threatening infection, perforation, expulsion, cramping, changes in bleeding and ovarian cysts. Benefits of the IUD and alternative family planning methods have been discussed.  Patients questions are answered.  Patient has verbalized understanding of risks and benefits and has signed the consent form.    Allergies   Allergen Reactions     Cats      Kiwi      Penicillins      PN: LW Reaction: HIVES     Pomegranate Extract      fruit     Seasonal Allergies      Current Outpatient Medications   Medication Sig Dispense Refill     albuterol (PROAIR HFA/PROVENTIL HFA/VENTOLIN HFA) 108 (90 Base) MCG/ACT inhaler Inhale 2 puffs into the lungs every 6 hours 2 Inhaler 1     fluticasone (FLONASE) 50 MCG/ACT nasal spray Spray 1-2 sprays into both nostrils daily 48 g 3     levonorgestrel (MIRENA) 20 MCG/24HR IUD 1 each (20 mcg) by Intrauterine route once for 1 dose 1 each 0     Spacer/Aero-Holding Chambers (E-Z SPACER) BLESSING Use with inhaler. 1 Device 0     ZYRTEC 10 MG OR TABS 1 TABLET DAILY        Past Medical History:   Diagnosis Date     Environmental allergies      Past Surgical History:   Procedure Laterality Date     CHOLECYSTECTOMY, LAPOROSCOPIC  2009     SURGICAL HISTORY OF -   ,            REVIEW OF SYSTEMS  CONSTITUTIONAL: Denies fever, chills and night sweats  BREASTS:  Denies discharge and lumps.    HEART/LUNGS: Denies dyspnea, wheezing, coughing and chest pain.  HEMATOLOGIC: Denies tendency to bruise and history of blood clots.  GENITOURINARY:  Denies urgency,  "dysuria and hematuria.  NEUROLOGIC:  Denies seizures, weakness and fainting.  PSYCHIATRIC: Negative for changes in mood or affect      EXAM:  /72 (BP Location: Right arm, Cuff Size: Adult Regular)   Pulse 80   Temp 98.7  F (37.1  C) (Tympanic)   Resp 14   Ht 1.556 m (5' 1.25\")   Wt 73.6 kg (162 lb 3.2 oz)   SpO2 98%   BMI 30.40 kg/m      Abdomen: soft, nontender, without hepatosplenomegaly or masses  : normal cervix, adnexae, and uterus without masses or discharge and iud strings visualized and removed with ring forceps without resist ence and intact  IUD type: Mirena  Lot # [id number] Exp: 07/2023    Procedure:  Uterus assessed for position and is Anteverted.  Speculum inserted.  Betadine prep of cervix done.  Tenaculum not used. Uterus sounded to 7 vcm's.  IUD inserted in the usual fashion without problems, ie: resistance, severe protracted pain or excessive bleeding.  Strings trimmed to 2 cm's.  Patient tolerated the procedure  well without any prolonged pain or syncopy.      ASSESSMENT/ PLAN:  ASSESSMENT / PLAN:  (Z30.433) Encounter for removal and reinsertion of intrauterine contraceptive device (IUD)  (primary encounter diagnosis)  Comment:   Plan:       Patient Instructions   You may experience a period or spotting that will last over the next week. This usually starts to lighten after a few days. It takes about a month for this spotting is usually gone, unless you are one of the few that experience spotting that can last up to 3-6 months. You may experience cramping for the remainder of the day and continue to take ibuprofen 2-3 tabs every six hours as needed. This will likely subside over the next day.      Please contact me by mychart of phone if you should have any unusual symptoms or concerns in the mean time.           Instructions given to patient regarding checking IUD strings, returning to the clinic if pain or inability to check strings and/or irregular bleeding.            "

## 2021-09-25 ENCOUNTER — HEALTH MAINTENANCE LETTER (OUTPATIENT)
Age: 46
End: 2021-09-25

## 2021-10-09 ENCOUNTER — IMMUNIZATION (OUTPATIENT)
Dept: PEDIATRICS | Facility: CLINIC | Age: 46
End: 2021-10-09
Payer: COMMERCIAL

## 2021-10-09 DIAGNOSIS — Z23 NEED FOR PROPHYLACTIC VACCINATION AND INOCULATION AGAINST INFLUENZA: Primary | ICD-10-CM

## 2021-10-09 PROCEDURE — 90471 IMMUNIZATION ADMIN: CPT

## 2021-10-09 PROCEDURE — 90686 IIV4 VACC NO PRSV 0.5 ML IM: CPT

## 2021-10-19 ENCOUNTER — ANCILLARY PROCEDURE (OUTPATIENT)
Dept: MAMMOGRAPHY | Facility: CLINIC | Age: 46
End: 2021-10-19
Payer: COMMERCIAL

## 2021-10-19 DIAGNOSIS — Z12.31 VISIT FOR SCREENING MAMMOGRAM: ICD-10-CM

## 2021-10-19 DIAGNOSIS — Z00.00 ROUTINE GENERAL MEDICAL EXAMINATION AT A HEALTH CARE FACILITY: ICD-10-CM

## 2021-10-19 PROCEDURE — 77063 BREAST TOMOSYNTHESIS BI: CPT | Mod: TC | Performed by: RADIOLOGY

## 2021-10-19 PROCEDURE — 77067 SCR MAMMO BI INCL CAD: CPT | Mod: TC | Performed by: RADIOLOGY

## 2021-11-21 ENCOUNTER — E-VISIT (OUTPATIENT)
Dept: PEDIATRICS | Facility: CLINIC | Age: 46
End: 2021-11-21
Payer: COMMERCIAL

## 2021-11-21 DIAGNOSIS — J06.9 UPPER RESPIRATORY TRACT INFECTION, UNSPECIFIED TYPE: Primary | ICD-10-CM

## 2021-11-21 PROCEDURE — 99421 OL DIG E/M SVC 5-10 MIN: CPT | Performed by: NURSE PRACTITIONER

## 2021-11-23 ENCOUNTER — TRANSFERRED RECORDS (OUTPATIENT)
Dept: HEALTH INFORMATION MANAGEMENT | Facility: CLINIC | Age: 46
End: 2021-11-23
Payer: COMMERCIAL

## 2022-04-23 DIAGNOSIS — J45.20 INTERMITTENT ASTHMA, UNCOMPLICATED: ICD-10-CM

## 2022-04-27 RX ORDER — ALBUTEROL SULFATE 90 UG/1
2 AEROSOL, METERED RESPIRATORY (INHALATION) EVERY 6 HOURS
Qty: 18 G | Refills: 1 | Status: SHIPPED | OUTPATIENT
Start: 2022-04-27 | End: 2022-11-07

## 2022-04-27 NOTE — TELEPHONE ENCOUNTER
Last refilled 2 years ago. Routing refill request to provider for review/approval because:  Labs not current:  ACT    Trenton THURSTON RN

## 2022-05-05 ENCOUNTER — TELEPHONE (OUTPATIENT)
Dept: PEDIATRICS | Facility: CLINIC | Age: 47
End: 2022-05-05

## 2022-05-05 NOTE — TELEPHONE ENCOUNTER
Patient Quality Outreach    Patient is due for the following:   Asthma  -  ACT needed  Physical  - Due after 4-15-22    NEXT STEPS:   Schedule a yearly physical    Type of outreach:    Sent [employer] message.    Next Steps:  Reach out within 90 days via Phone.    Max number of attempts reached: No. Will try again in 90 days if patient still on fail list.    Questions for provider review:    None     Atnonina Rey, [facility]  Chart routed to self.

## 2022-07-02 ENCOUNTER — HEALTH MAINTENANCE LETTER (OUTPATIENT)
Age: 47
End: 2022-07-02

## 2022-07-29 ASSESSMENT — ASTHMA QUESTIONNAIRES: ACT_TOTALSCORE: 25

## 2022-07-29 NOTE — TELEPHONE ENCOUNTER
Patient Quality Outreach    Patient is due for the following:   Asthma  -  ACT needed  Physical  - overdue    NEXT STEPS:   Patient has upcoming appointment, these items will be addressed at that time.    Type of outreach:    Phone, spoke to patient/parent. see above    Next Steps:  Reach out within 90 days via NA.    Max number of attempts reached: No. Will try again in 90 days if patient still on fail list.    Questions for provider review:    None     Antonina Rey, [facility]  Chart routed to self.

## 2022-11-02 SDOH — ECONOMIC STABILITY: INCOME INSECURITY: IN THE LAST 12 MONTHS, WAS THERE A TIME WHEN YOU WERE NOT ABLE TO PAY THE MORTGAGE OR RENT ON TIME?: NO

## 2022-11-02 SDOH — ECONOMIC STABILITY: TRANSPORTATION INSECURITY
IN THE PAST 12 MONTHS, HAS THE LACK OF TRANSPORTATION KEPT YOU FROM MEDICAL APPOINTMENTS OR FROM GETTING MEDICATIONS?: NO

## 2022-11-02 SDOH — ECONOMIC STABILITY: FOOD INSECURITY: WITHIN THE PAST 12 MONTHS, THE FOOD YOU BOUGHT JUST DIDN'T LAST AND YOU DIDN'T HAVE MONEY TO GET MORE.: NEVER TRUE

## 2022-11-02 SDOH — HEALTH STABILITY: PHYSICAL HEALTH: ON AVERAGE, HOW MANY MINUTES DO YOU ENGAGE IN EXERCISE AT THIS LEVEL?: 40 MIN

## 2022-11-02 SDOH — HEALTH STABILITY: PHYSICAL HEALTH: ON AVERAGE, HOW MANY DAYS PER WEEK DO YOU ENGAGE IN MODERATE TO STRENUOUS EXERCISE (LIKE A BRISK WALK)?: 4 DAYS

## 2022-11-02 SDOH — ECONOMIC STABILITY: TRANSPORTATION INSECURITY
IN THE PAST 12 MONTHS, HAS LACK OF TRANSPORTATION KEPT YOU FROM MEETINGS, WORK, OR FROM GETTING THINGS NEEDED FOR DAILY LIVING?: NO

## 2022-11-02 SDOH — ECONOMIC STABILITY: FOOD INSECURITY: WITHIN THE PAST 12 MONTHS, YOU WORRIED THAT YOUR FOOD WOULD RUN OUT BEFORE YOU GOT MONEY TO BUY MORE.: NEVER TRUE

## 2022-11-02 SDOH — ECONOMIC STABILITY: INCOME INSECURITY: HOW HARD IS IT FOR YOU TO PAY FOR THE VERY BASICS LIKE FOOD, HOUSING, MEDICAL CARE, AND HEATING?: NOT HARD AT ALL

## 2022-11-02 ASSESSMENT — ENCOUNTER SYMPTOMS
CHILLS: 0
SORE THROAT: 0
COUGH: 0
FREQUENCY: 0
EYE PAIN: 0
BREAST MASS: 0
NERVOUS/ANXIOUS: 0
ABDOMINAL PAIN: 0
ARTHRALGIAS: 0
NAUSEA: 0
WEAKNESS: 0
HEARTBURN: 0
PARESTHESIAS: 0
SHORTNESS OF BREATH: 0
PALPITATIONS: 0
MYALGIAS: 0
DIZZINESS: 0
DYSURIA: 0
DIARRHEA: 0
HEADACHES: 0
CONSTIPATION: 0
HEMATOCHEZIA: 0
JOINT SWELLING: 0
FEVER: 0
HEMATURIA: 0

## 2022-11-02 ASSESSMENT — ASTHMA QUESTIONNAIRES
QUESTION_1 LAST FOUR WEEKS HOW MUCH OF THE TIME DID YOUR ASTHMA KEEP YOU FROM GETTING AS MUCH DONE AT WORK, SCHOOL OR AT HOME: NONE OF THE TIME
ACT_TOTALSCORE: 25
QUESTION_5 LAST FOUR WEEKS HOW WOULD YOU RATE YOUR ASTHMA CONTROL: COMPLETELY CONTROLLED
QUESTION_2 LAST FOUR WEEKS HOW OFTEN HAVE YOU HAD SHORTNESS OF BREATH: NOT AT ALL
ACT_TOTALSCORE: 25
QUESTION_3 LAST FOUR WEEKS HOW OFTEN DID YOUR ASTHMA SYMPTOMS (WHEEZING, COUGHING, SHORTNESS OF BREATH, CHEST TIGHTNESS OR PAIN) WAKE YOU UP AT NIGHT OR EARLIER THAN USUAL IN THE MORNING: NOT AT ALL
QUESTION_4 LAST FOUR WEEKS HOW OFTEN HAVE YOU USED YOUR RESCUE INHALER OR NEBULIZER MEDICATION (SUCH AS ALBUTEROL): NOT AT ALL

## 2022-11-02 ASSESSMENT — LIFESTYLE VARIABLES
HOW MANY STANDARD DRINKS CONTAINING ALCOHOL DO YOU HAVE ON A TYPICAL DAY: 1 OR 2
HOW OFTEN DO YOU HAVE A DRINK CONTAINING ALCOHOL: 2-4 TIMES A MONTH
SKIP TO QUESTIONS 9-10: 1
HOW OFTEN DO YOU HAVE SIX OR MORE DRINKS ON ONE OCCASION: NEVER
AUDIT-C TOTAL SCORE: 2

## 2022-11-02 ASSESSMENT — SOCIAL DETERMINANTS OF HEALTH (SDOH)
HOW OFTEN DO YOU ATTEND CHURCH OR RELIGIOUS SERVICES?: 1 TO 4 TIMES PER YEAR
IN A TYPICAL WEEK, HOW MANY TIMES DO YOU TALK ON THE PHONE WITH FAMILY, FRIENDS, OR NEIGHBORS?: THREE TIMES A WEEK
DO YOU BELONG TO ANY CLUBS OR ORGANIZATIONS SUCH AS CHURCH GROUPS UNIONS, FRATERNAL OR ATHLETIC GROUPS, OR SCHOOL GROUPS?: YES
HOW OFTEN DO YOU GET TOGETHER WITH FRIENDS OR RELATIVES?: TWICE A WEEK

## 2022-11-07 ENCOUNTER — OFFICE VISIT (OUTPATIENT)
Dept: PEDIATRICS | Facility: CLINIC | Age: 47
End: 2022-11-07
Payer: COMMERCIAL

## 2022-11-07 VITALS
BODY MASS INDEX: 31.26 KG/M2 | HEART RATE: 74 BPM | SYSTOLIC BLOOD PRESSURE: 130 MMHG | RESPIRATION RATE: 16 BRPM | DIASTOLIC BLOOD PRESSURE: 78 MMHG | HEIGHT: 61 IN | WEIGHT: 165.6 LBS | OXYGEN SATURATION: 99 % | TEMPERATURE: 98.4 F

## 2022-11-07 DIAGNOSIS — Z12.11 SCREEN FOR COLON CANCER: ICD-10-CM

## 2022-11-07 DIAGNOSIS — Z82.49 FAMILY HISTORY OF ISCHEMIC HEART DISEASE: ICD-10-CM

## 2022-11-07 DIAGNOSIS — Z12.4 CERVICAL CANCER SCREENING: ICD-10-CM

## 2022-11-07 DIAGNOSIS — J45.20 INTERMITTENT ASTHMA, UNCOMPLICATED: ICD-10-CM

## 2022-11-07 DIAGNOSIS — R06.83 SNORES: ICD-10-CM

## 2022-11-07 DIAGNOSIS — Z00.00 ENCOUNTER FOR PREVENTIVE HEALTH EXAMINATION: Primary | ICD-10-CM

## 2022-11-07 DIAGNOSIS — Z12.31 VISIT FOR SCREENING MAMMOGRAM: ICD-10-CM

## 2022-11-07 DIAGNOSIS — Z23 HIGH PRIORITY FOR 2019-NCOV VACCINE: ICD-10-CM

## 2022-11-07 LAB
ALBUMIN SERPL-MCNC: 4 G/DL (ref 3.4–5)
ALP SERPL-CCNC: 56 U/L (ref 40–150)
ALT SERPL W P-5'-P-CCNC: 40 U/L (ref 0–50)
ANION GAP SERPL CALCULATED.3IONS-SCNC: 6 MMOL/L (ref 3–14)
AST SERPL W P-5'-P-CCNC: 23 U/L (ref 0–45)
BILIRUB SERPL-MCNC: 1.1 MG/DL (ref 0.2–1.3)
BUN SERPL-MCNC: 9 MG/DL (ref 7–30)
CALCIUM SERPL-MCNC: 8.7 MG/DL (ref 8.5–10.1)
CHLORIDE BLD-SCNC: 109 MMOL/L (ref 94–109)
CHOLEST SERPL-MCNC: 250 MG/DL
CO2 SERPL-SCNC: 26 MMOL/L (ref 20–32)
CREAT SERPL-MCNC: 0.69 MG/DL (ref 0.52–1.04)
FASTING STATUS PATIENT QL REPORTED: YES
GFR SERPL CREATININE-BSD FRML MDRD: >90 ML/MIN/1.73M2
GLUCOSE BLD-MCNC: 91 MG/DL (ref 70–99)
HBA1C MFR BLD: 5.5 % (ref 0–5.6)
HDLC SERPL-MCNC: 62 MG/DL
LDLC SERPL CALC-MCNC: 170 MG/DL
NONHDLC SERPL-MCNC: 188 MG/DL
POTASSIUM BLD-SCNC: 4 MMOL/L (ref 3.4–5.3)
PROT SERPL-MCNC: 7 G/DL (ref 6.8–8.8)
SODIUM SERPL-SCNC: 141 MMOL/L (ref 133–144)
TRIGL SERPL-MCNC: 88 MG/DL

## 2022-11-07 PROCEDURE — 80053 COMPREHEN METABOLIC PANEL: CPT | Performed by: NURSE PRACTITIONER

## 2022-11-07 PROCEDURE — 90471 IMMUNIZATION ADMIN: CPT | Performed by: NURSE PRACTITIONER

## 2022-11-07 PROCEDURE — 83036 HEMOGLOBIN GLYCOSYLATED A1C: CPT | Performed by: NURSE PRACTITIONER

## 2022-11-07 PROCEDURE — 36415 COLL VENOUS BLD VENIPUNCTURE: CPT | Performed by: NURSE PRACTITIONER

## 2022-11-07 PROCEDURE — 99213 OFFICE O/P EST LOW 20 MIN: CPT | Mod: 25 | Performed by: NURSE PRACTITIONER

## 2022-11-07 PROCEDURE — 90686 IIV4 VACC NO PRSV 0.5 ML IM: CPT | Performed by: NURSE PRACTITIONER

## 2022-11-07 PROCEDURE — 87624 HPV HI-RISK TYP POOLED RSLT: CPT | Performed by: NURSE PRACTITIONER

## 2022-11-07 PROCEDURE — 0134A COVID-19,PF,MODERNA BIVALENT: CPT | Performed by: NURSE PRACTITIONER

## 2022-11-07 PROCEDURE — 99396 PREV VISIT EST AGE 40-64: CPT | Mod: 25 | Performed by: NURSE PRACTITIONER

## 2022-11-07 PROCEDURE — 91313 COVID-19,PF,MODERNA BIVALENT: CPT | Performed by: NURSE PRACTITIONER

## 2022-11-07 PROCEDURE — G0145 SCR C/V CYTO,THINLAYER,RESCR: HCPCS | Performed by: NURSE PRACTITIONER

## 2022-11-07 PROCEDURE — 80061 LIPID PANEL: CPT | Performed by: NURSE PRACTITIONER

## 2022-11-07 RX ORDER — ALBUTEROL SULFATE 90 UG/1
2 AEROSOL, METERED RESPIRATORY (INHALATION) EVERY 6 HOURS
Qty: 18 G | Refills: 1 | Status: SHIPPED | OUTPATIENT
Start: 2022-11-07 | End: 2023-04-19

## 2022-11-07 ASSESSMENT — ENCOUNTER SYMPTOMS
JOINT SWELLING: 0
PARESTHESIAS: 0
SHORTNESS OF BREATH: 0
HEADACHES: 0
HEMATOCHEZIA: 0
CONSTIPATION: 0
EYE PAIN: 0
BREAST MASS: 0
WEAKNESS: 0
PALPITATIONS: 0
HEMATURIA: 0
NERVOUS/ANXIOUS: 0
MYALGIAS: 0
ARTHRALGIAS: 0
ABDOMINAL PAIN: 0
COUGH: 0
DIARRHEA: 0
NAUSEA: 0
FREQUENCY: 0
HEARTBURN: 0
DIZZINESS: 0
SORE THROAT: 0
FEVER: 0
CHILLS: 0
DYSURIA: 0

## 2022-11-07 ASSESSMENT — PAIN SCALES - GENERAL: PAINLEVEL: NO PAIN (0)

## 2022-11-07 NOTE — PROGRESS NOTES
SUBJECTIVE:   CC: Genny is an 47 year old who presents for preventive health visit.     Patient has been advised of split billing requirements and indicates understanding: Yes  Healthy Habits:     Getting at least 3 servings of Calcium per day:  Yes    Bi-annual eye exam:  Yes    Dental care twice a year:  Yes    Sleep apnea or symptoms of sleep apnea:  Excessive snoring    Diet:  Low fat/cholesterol    Frequency of exercise:  6-7 days/week    Duration of exercise:  45-60 minutes    Taking medications regularly:  Yes    Medication side effects:  None    PHQ-2 Total Score: 0    Additional concerns today:  No    Brother had a stroke at age 39, is disabled, aphagia, unsure of 'cause.' Sister is 4 yrs younger at age 42 and had an MI.     Snores,  does report stop breathing at night.     History of intermittent asthma, notes triggers are pets.     ACT Total Scores 4/15/2021 7/29/2022 11/2/2022   ACT TOTAL SCORE - - -   ASTHMA ER VISITS - - -   ASTHMA HOSPITALIZATIONS - - -   ACT TOTAL SCORE (Goal Greater than or Equal to 20) 25 25 25   In the past 12 months, how many times did you visit the emergency room for your asthma without being admitted to the hospital? 0 0 0   In the past 12 months, how many times were you hospitalized overnight because of your asthma? 0 0 0       Today's PHQ-2 Score:   PHQ-2 ( 1999 Pf[employer]) 11/2/2022   Q1: Little interest or pleasure in doing things 0   Q2: Feeling down, depressed or hopeless 0   PHQ-2 Score 0   PHQ-2 Total Score (12-17 Years)- Positive if 3 or more points; Administer PHQ-A if positive -   Q1: Little interest or pleasure in doing things Not at all   Q2: Feeling down, depressed or hopeless Not at all   PHQ-2 Score 0       Abuse: Current or Past (Physical, Sexual or Emotional) - No  Do you feel safe in your environment? Yes        Social History     Tobacco Use     Smoking status: Never     Smokeless tobacco: Never   Substance Use Topics     Alcohol use: Yes      Comment: Not even weekly, but occasional     If you drink alcohol do you typically have >3 drinks per day or >7 drinks per week? No    Alcohol Use 11/7/2022   Prescreen: >3 drinks/day or >7 drinks/week? -   Prescreen: >3 drinks/day or >7 drinks/week? No   No flowsheet data found.    Reviewed orders with patient.  Reviewed health maintenance and updated orders accordingly - Yes  Lab work is in process    Breast Cancer Screening:    FHS-7:   Breast CA Risk Assessment (FHS-7) 10/19/2021 11/2/2022   Did any of your first-degree relatives have breast or ovarian cancer? No No   Did any of your relatives have bilateral breast cancer? No No   Did any man in your family have breast cancer? No No   Did any woman in your family have breast and ovarian cancer? No No   Did any woman in your family have breast cancer before age 50 y? No No   Do you have 2 or more relatives with breast and/or ovarian cancer? No No   Do you have 2 or more relatives with breast and/or bowel cancer? No No       Mammogram Screening: Recommended annual mammography  Pertinent mammograms are reviewed under the imaging tab.    History of abnormal Pap smear: NO - age 30-65 PAP every 5 years with negative HPV co-testing recommended  PAP / HPV Latest Ref Rng & Units 1/24/2018 11/1/2013 11/3/2010   PAP (Historical) - NIL NIL NIL   HPV16 NEG:Negative Negative - -   HPV18 NEG:Negative Negative - -   HRHPV NEG:Negative Negative - -     Reviewed and updated as needed this visit by clinical staff   Tobacco  Allergies  Meds   Med Hx  Surg Hx  Fam Hx  Soc Hx        Reviewed and updated as needed this visit by Provider     Meds                 Review of Systems   Constitutional: Negative for chills and fever.   HENT: Negative for congestion, ear pain, hearing loss and sore throat.    Eyes: Negative for pain and visual disturbance.   Respiratory: Negative for cough and shortness of breath.    Cardiovascular: Negative for chest pain, palpitations and peripheral  "edema.   Gastrointestinal: Negative for abdominal pain, constipation, diarrhea, heartburn, hematochezia and nausea.   Breasts:  Negative for tenderness, breast mass and discharge.   Genitourinary: Negative for dysuria, frequency, genital sores, hematuria, pelvic pain, urgency, vaginal bleeding and vaginal discharge.   Musculoskeletal: Negative for arthralgias, joint swelling and myalgias.   Skin: Negative for rash.   Neurological: Negative for dizziness, weakness, headaches and paresthesias.   Psychiatric/Behavioral: Negative for mood changes. The patient is not nervous/anxious.         OBJECTIVE:   /78 (BP Location: Right arm, Patient Position: Sitting, Cuff Size: Adult Regular)   Pulse 74   Temp 98.4  F (36.9  C) (Temporal)   Resp 16   Ht 1.557 m (5' 1.3\")   Wt 75.1 kg (165 lb 9.6 oz)   SpO2 99%   BMI 30.99 kg/m    Physical Exam  GENERAL: healthy, alert and no distress  EYES: Eyes grossly normal to inspection, PERRL and conjunctivae and sclerae normal  HENT: ear canals and TM's normal, nose and mouth without ulcers or lesions  NECK: no adenopathy, no asymmetry, masses, or scars and thyroid normal to palpation  RESP: lungs clear to auscultation - no rales, rhonchi or wheezes  CV: regular rate and rhythm, normal S1 S2, no S3 or S4, no murmur, click or rub, no peripheral edema and peripheral pulses strong  ABDOMEN: soft, nontender, no hepatosplenomegaly, no masses and bowel sounds normal   (female): normal female external genitalia, normal urethral meatus, vaginal mucosa, normal cervix/adnexa/uterus without masses or discharge  MS: no gross musculoskeletal defects noted, no edema  PSYCH: mentation appears normal, affect normal/bright      ASSESSMENT/PLAN:   (Z00.00) Encounter for preventive health examination  (primary encounter diagnosis)  Comment:   Plan: Lipid panel reflex to direct LDL Fasting,         Hemoglobin A1c, Comprehensive metabolic panel         (BMP + Alb, Alk Phos, ALT, AST, Total. " Bili,         TP), Pap screen with HPV - recommended age 30 -        65 years          (Z12.11) Screen for colon cancer  Comment:   Plan: Colonoscopy Screening  Referral,         Hemoglobin A1c, Comprehensive metabolic panel         (BMP + Alb, Alk Phos, ALT, AST, Total. Bili,         TP)          (Z12.31) Visit for screening mammogram  Comment:   Plan: MA SCREENING DIGITAL BILAT - Future  (s+30),         Hemoglobin A1c, Comprehensive metabolic panel         (BMP + Alb, Alk Phos, ALT, AST, Total. Bili,         TP)          (Z12.4) Cervical cancer screening  Comment:   Plan: Hemoglobin A1c, Comprehensive metabolic panel         (BMP + Alb, Alk Phos, ALT, AST, Total. Bili,         TP), Pap screen with HPV - recommended age 30 -        65 years          (Z23) High priority for 2019-nCoV vaccine  Comment:   Plan: Hemoglobin A1c, Comprehensive metabolic panel         (BMP + Alb, Alk Phos, ALT, AST, Total. Bili,         TP)          (J45.20) Intermittent asthma, uncomplicated  Comment: stable, no concerns  Plan: Hemoglobin A1c, Comprehensive metabolic panel         (BMP + Alb, Alk Phos, ALT, AST, Total. Bili,         TP), albuterol (PROAIR HFA/PROVENTIL         HFA/VENTOLIN HFA) 108 (90 Base) MCG/ACT inhaler          (Z82.49) Family history of ischemic heart disease  Comment: Brother massive stroke age 39  Sister MI at age 42  Plan: Adult Genetics & Metabolism Referral,         Hemoglobin A1c, Comprehensive metabolic panel         (BMP + Alb, Alk Phos, ALT, AST, Total. Bili,         TP), Adult Sleep Eval & Management          Referral        Discussed these new risk factors. Seeing both her brother and sister had major cardiac issues at a fairly young age, will refer to genetics cardiology. Discussed role of weight, diet, exercise, cholesterol, bp in lowering risks.     (R06.83) Snores  Comment: discussed role of snoring and heart. This is a longstanding issue for which she has not been evlauated. Was  "previous referred, but never went. Will see sleep, likely sleep study  Plan: Adult Sleep Eval & Management          Referral             COUNSELING:  Reviewed preventive health counseling, as reflected in patient instructions  Special attention given to:        Regular exercise       Healthy diet/nutrition       Contraception       Osteoporosis prevention/bone health    Estimated body mass index is 30.99 kg/m  as calculated from the following:    Height as of this encounter: 1.557 m (5' 1.3\").    Weight as of this encounter: 75.1 kg (165 lb 9.6 oz).    Weight management plan: Discussed healthy diet and exercise guidelines    She reports that she has never smoked. She has never used smokeless tobacco.      Counseling Resources:  ATP IV Guidelines  Pooled Cohorts Equation Calculator  Breast Cancer Risk Calculator  BRCA-Related Cancer Risk Assessment: FHS-7 Tool  FRAX Risk Assessment  ICSI Preventive Guidelines  Dietary Guidelines for Americans, 2010  USDA's MyPlate  ASA Prophylaxis  Lung CA Screening    Elizabeth Cerda, GRISEL CNP  [facility] [place]  "

## 2022-11-09 LAB
BKR LAB AP GYN ADEQUACY: NORMAL
BKR LAB AP GYN INTERPRETATION: NORMAL
BKR LAB AP HPV REFLEX: NORMAL
BKR LAB AP PREVIOUS ABNORMAL: NORMAL
PATH REPORT.COMMENTS IMP SPEC: NORMAL
PATH REPORT.COMMENTS IMP SPEC: NORMAL
PATH REPORT.RELEVANT HX SPEC: NORMAL

## 2022-11-11 LAB
HUMAN PAPILLOMA VIRUS 16 DNA: NEGATIVE
HUMAN PAPILLOMA VIRUS 18 DNA: NEGATIVE
HUMAN PAPILLOMA VIRUS FINAL DIAGNOSIS: NORMAL
HUMAN PAPILLOMA VIRUS OTHER HR: NEGATIVE

## 2022-11-29 ENCOUNTER — ANCILLARY PROCEDURE (OUTPATIENT)
Dept: MAMMOGRAPHY | Facility: CLINIC | Age: 47
End: 2022-11-29
Attending: NURSE PRACTITIONER
Payer: COMMERCIAL

## 2022-11-29 DIAGNOSIS — Z12.31 VISIT FOR SCREENING MAMMOGRAM: ICD-10-CM

## 2022-11-29 PROCEDURE — 77067 SCR MAMMO BI INCL CAD: CPT | Mod: TC | Performed by: RADIOLOGY

## 2022-11-29 PROCEDURE — 77063 BREAST TOMOSYNTHESIS BI: CPT | Mod: TC | Performed by: RADIOLOGY

## 2022-12-02 ENCOUNTER — TELEPHONE (OUTPATIENT)
Dept: GASTROENTEROLOGY | Facility: CLINIC | Age: 47
End: 2022-12-02

## 2022-12-02 NOTE — TELEPHONE ENCOUNTER
Screening Questions  BLUE  KIND OF PREP RED  LOCATION [review exclusion criteria] GREEN  SEDATION TYPE        Y Are you active on mychart?       JOAQUIN THURMAN Ordering/Referring Provider?        BCBS What type of coverage do you have?      N Have you had a positive covid test in the last 14 days?     30.8 1. BMI  [BMI 40+ - review exclusion criteria]    Y  2. Are you able to give consent for your medical care? [IF NO,RN REVIEW]        N  3. Are you taking any prescription pain medications on a routine schedule?        3a. EXTENDED PREP What kind of prescription?     N 4. Do you have any chemical dependencies such as alcohol, street drugs, or methadone?    N 5. Do you have any history of post-traumatic stress syndrome, severe anxiety or history of psychosis?      **If yes 3- 5 , please schedule with MAC sedation.**          IF YES TO ANY 6 - 10 - HOSPITAL SETTING ONLY.     N 6.   Do you need assistance transferring?     N 7.   Have you had a heart or lung transplant?    N 8.   Are you currently on dialysis?   N 9.   Do you use daily home oxygen?   N 10. Do you take nitroglycerin?   10a.  If yes, how often?     11. [FEMALES]  N Are you currently pregnant?    11a.  If yes, how many weeks? [ Greater than 12 weeks, OR NEEDED]    N 12. Do you have Pulmonary Hypertension? *NEED PAC APPT AT [facility]*     N 13. [review exclusion criteria]  Do you have any implantable devices in your body (pacemaker, defib, LVAD)?    N 14. In the past 6 months, have you had any heart related issues including cardiomyopathy or heart attack?     14a.  If yes, did it require cardiac stenting if so when?     N 15. Have you had a stroke or Transient ischemic attack (TIA - aka  mini stroke ) within 6 months?      N 16. Do you have mod to severe Obstructive Sleep Apnea?  [Hospital only - Ok at [place]]    N 17. Do you have SEVERE AND UNCONTROLLED asthma? *NEED PAC APPT AT [facility]*     N 18. Are you currently taking any blood thinners?     5/10/22     Gabbie Polanco    : 1963 Sex: female   Age: 63 y.o. Chief Complaint   Patient presents with    Dizziness     still having dizziness, not feeling better       Prior to Admission medications    Medication Sig Start Date End Date Taking? Authorizing Provider   NONFORMULARY Neurotrophin PMG   Yes Historical Provider, MD   Omega-3 Fatty Acids (OMEGA-3 FISH OIL PO) Take by mouth   Yes Historical Provider, MD   TURMERIC PO Take by mouth   Yes Historical Provider, MD   NONFORMULARY Cataplex G   Yes Historical Provider, MD   NONFORMULARY Asura Brain   Yes Historical Provider, MD   blood glucose monitor strips Test one times a day  Freestyle Ace Lyte 5/10/22  Yes James Thompson DO   Lancets [address] 1 each by Does not apply route daily 5/10/22  Yes Stan Oates DO   desvenlafaxine succinate (PRISTIQ) 100 MG TB24 extended release tablet TAKE 1 TABLET DAILY 22  Yes Stan Oates DO   acetaminophen (TYLENOL) 325 MG tablet Take 650 mg by mouth every 6 hours as needed for Pain   Yes Historical Provider, MD   Melatonin 10 MG TABS Take by mouth   Yes Historical Provider, MD   pantoprazole (PROTONIX) 40 MG tablet Take 1 tablet by mouth daily 22  Yes Stan Oates DO   rosuvastatin (CRESTOR) 5 MG tablet Take 1 tablet by mouth daily 22  Yes Stan Oates DO   amitriptyline (ELAVIL) 25 MG tablet Take 1 tablet by mouth nightly 22  Yes Stan Oates DO   busPIRone (BUSPAR) 5 MG tablet Take 1 tablet by mouth 2 times daily 22  Yes Stan Oates DO   metFORMIN (GLUCOPHAGE-XR) 500 MG extended release tablet 2 morning  2 dinner 22  Yes James Thompson DO          HPI: Patient is seen today status post severe head injury subarachnoid hemorrhage subdural hematoma. Patient was admitted 2022 for subarachnoid hemorrhage and subdural hematoma. Presently she continues with postconcussion type symptoms.   Visual complaints and memory deficits along " 18a. If yes, inform patient to \"follow up w/ ordering provider for bridging instructions.\"    N 19. Do you take the medication Phentermine?    19a. If yes, \"Hold for 7 days before procedure.  Please consult your prescribing provider if you have questions about holding this medication.\"     N  20. Do you have chronic kidney disease?      N  21. Do you have a diagnosis of diabetes?     N  22. On a regular basis do you go 3-5 days between bowel movements?      23. Preferred LOCAL Pharmacy for Pre Prescription    [ LIST ONLY ONE PHARMACY]        [id number] - [place], [place] - [address] AT [address]        - CLOSING REMINDERS -    Informed patient they will need an adult    Cannot take any type of public or medical transportation alone    Conscious Sedation- Needs  for 6 hours after the procedure       MAC/General-Needs  for 24 hours after procedure    Pre-Procedure Covid test to be completed [[facility] PCR Testing Required]    Confirmed Nurse will call to complete assessment       - SCHEDULING DETAILS -     DANIELLA  Surgeon    2/24/2023  Date of Procedure  Lower Endoscopy [Colonoscopy]  Type of Procedure Scheduled  [facility]-If you answer yes to questions #8, #20, #21Which Colonoscopy Prep was Sent?     MODERATE Sedation Type     NO PAC / Pre-op Required         Additional comments:            " with persistent fatigue. She is back working but on a part-time basis. I am recommending an evaluation with urology as well as physical rehab for further input. Review of Systems   Constitutional: Negative. HENT: Negative. Eyes: Negative. Respiratory: Negative. Gastrointestinal: Negative. Endocrine: Negative. Genitourinary: Negative. Musculoskeletal: Negative. Skin: Negative. Allergic/Immunologic: Negative. Neurological: Negative. Hematological: Negative. Psychiatric/Behavioral: Negative. Continued postconcussion symptoms as noted. Physical strength is intact currently. Managing activities of daily living. Working part-time. Persistent fatigue remains a voice complaint along with short-term cognitive changes.           Current Outpatient Medications:     NONFORMULARY, Neurotrophin PMG, Disp: , Rfl:     Omega-3 Fatty Acids (OMEGA-3 FISH OIL PO), Take by mouth, Disp: , Rfl:     TURMERIC PO, Take by mouth, Disp: , Rfl:     NONFORMULARY, Cataplex G, Disp: , Rfl:     NONFORMULARY, Asura Brain, Disp: , Rfl:     blood glucose monitor strips, Test one times a day Freestyle Freedom Lyte, Disp: 400 strip, Rfl: 1    Lancets MISC, 1 each by Does not apply route daily, Disp: 300 each, Rfl: 1    desvenlafaxine succinate (PRISTIQ) 100 MG TB24 extended release tablet, TAKE 1 TABLET DAILY, Disp: 14 tablet, Rfl: 0    acetaminophen (TYLENOL) 325 MG tablet, Take 650 mg by mouth every 6 hours as needed for Pain, Disp: , Rfl:     Melatonin 10 MG TABS, Take by mouth, Disp: , Rfl:     pantoprazole (PROTONIX) 40 MG tablet, Take 1 tablet by mouth daily, Disp: 90 tablet, Rfl: 3    rosuvastatin (CRESTOR) 5 MG tablet, Take 1 tablet by mouth daily, Disp: 90 tablet, Rfl: 3    amitriptyline (ELAVIL) 25 MG tablet, Take 1 tablet by mouth nightly, Disp: 90 tablet, Rfl: 3    busPIRone (BUSPAR) 5 MG tablet, Take 1 tablet by mouth 2 times daily, Disp: 180 tablet, Rfl: 1    metFORMIN (GLUCOPHAGE-XR) 500 MG extended release tablet, 2 morning  2 dinner, Disp: 360 tablet, Rfl: 3    Allergies   Allergen Reactions    Methylphenidate Derivatives        Social History     Tobacco Use    Smoking status: Never Smoker    Smokeless tobacco: Never Used   Vaping Use    Vaping Use: Never used   Substance Use Topics    Alcohol use: Not Currently    Drug use: Not Currently      Past Surgical History:   Procedure Laterality Date    TONSILLECTOMY      at age 11     Family History   Problem Relation Age of Onset    Diabetes Father     Heart Disease Father      Past Medical History:   Diagnosis Date    Depression     Diabetes mellitus ([facility] [address]     Gastritis     Hyperlipidemia        Vitals:    05/10/22 1602   BP: 138/80   Pulse: 92   Temp: 98.2 °F (36.8 °C)   SpO2: 96%   Height: 5' 3\" (1.6 m)     BP Readings from Last 3 Encounters:   05/10/22 138/80   04/22/22 120/82   04/13/22 122/84        Physical Exam  Vitals and nursing note reviewed. Constitutional:       Appearance: She is well-developed. HENT:      Head: Normocephalic. Right Ear: External ear normal.      Left Ear: External ear normal.      Nose: Nose normal.   Eyes:      Conjunctiva/sclera: Conjunctivae normal.      Pupils: Pupils are equal, round, and reactive to light. Cardiovascular:      Rate and Rhythm: Normal rate. Pulmonary:      Breath sounds: Normal breath sounds. Abdominal:      General: Bowel sounds are normal.      Palpations: Abdomen is soft. Musculoskeletal:         General: Normal range of motion. Cervical back: Normal range of motion and neck supple. Skin:     General: Skin is warm and dry. Neurological:      Mental Status: She is alert and oriented to person, place, and time. Psychiatric:         Behavior: Behavior normal.     Overall physical exam findings are stable. She is alert and oriented and appropriate. Strength is intact. No gross neurologic abnormalities. Completed FMLA  forms. Reassessment next 4 weeks. Plan Per Assessment:  Emilia Taylor was seen today for dizziness. Diagnoses and all orders for this visit:    Essential hypertension    Type 2 diabetes mellitus without complication, without long-term current use of insulin (HCC)    Subarachnoid hemorrhage (HCC)  -     Camryn Díaz MD, Neurology, Tiki Arellano MD, Physical Medicine and Rehabilitation, Stefan    SDH (subdural hematoma) [facility])  -     Camryn Díaz MD, Neurology, Martin Cabral, Celi Jaime MD, Physical Medicine and Rehabilitation, [place]    Anxiety    Post concussion syndrome  -     Camryn Díaz MD, Neurology, Martin Cabral, Celi Jaime MD, Physical Medicine and Rehabilitation, [facility]    Other orders  -     blood glucose monitor strips; Test one times a day  Freestyle Freedom Lyte  -     Lancets MISC; 1 each by Does not apply route daily            Return in about 4 weeks (around 6/7/2022). Elvis Ashraf DO    Note was generated with the assistance of voice recognition software. Document was reviewed however may contain grammatical errors.

## 2023-01-04 NOTE — PROGRESS NOTES
Here is a copy of the progress note from your recent genetic counseling visit to [facility] on Date: 2023.    PROGRESS NOTE:Genny was seen for genetic counseling due to her family history of recent cardiac issues in her siblings.  I had the opportunity to talk with Genny today to discuss the genetic component of heart disease and testing options available to her .     MEDICAL HISTORY: Genny reports that she has had high cholesterol since she was about 19 years of age.  Lipid levels on 22 showed total cholesterol at 250, LDL at 170, HDL at 62, and triglycerides at 88.  She is not on an cholesterol medications.     Genny reports that she also has a history of depression in college, asthma, allergies, tonsillectomy and cholecystectomy.    FAMILY HISTORY:A detailed family history was obtained today and was significant for the following cardiac history:      Brother (39) had a stroke 2022.  He has ongoing right sided weakness and aphagia.  At this time they are unsure of 'cause.'  However it sounds like he has a loop recorder. He has two sons.  Oldest (2.5 years) was born with holes in his heart but has not required any surgical intervention.    Sister (42) had a heart attack in 2022.  She is known to have high cholesterol. She states that she had left atrial enlargement and ventricular conduction delay with septal MI.    Another brother (35) in good health.    Mother with high cholesterol and some type of arrhythmia.    Maternal aunt had epilepsy.  Her daughter has an abnormal heart rhythm.    Maternal grandfather  at 69 years from an aortic rupture.  He was aware of the aneurysm but declined surgery.  Grandmother  at 82 years.  She had heart failure with onset in her 70's.  She had hypertension and a global seizure disorder.  Her brother also had heart failure.    Father has high cholesterol and skin cancer spots.  He has 5 living  siblings without known heart disease. One brother  by suicide at 23 years.    Paternal grandfather  around 80 years.  He had five heart attacks, with the first occurring in his 50's.   Grandmother  at 82 years with skin and colon cancer.    Two daughters in good health.  There is no additional history of cardiomyopathy, arrhythmias, heart attacks, fainting, sudden cardiac death, genetic conditions, or birth defects. (Scanned pedigree may be under media tab)    DISCUSSION:  Explained that there are many different types of cardiac issues and often they are not related to one another.  Without more detailed information about the various issues in her family it is hard to know if some of them are connected or not.  We do know that high cholesterol can be caused by both genetic and non-genetic factors.  There are lots of well known diet and lifestyle choices that are known to increase risk for high cholesterol.  However, in some families, there is an underlying genetic predisposition for high cholesterol. Familial hypercholesterolemia (FH) is a genetic condition characterized by abnormally high concentrations of low density lipoprotein cholesterol (LDL-C) in the blood since birth, which predisposes affected individuals to premature coronary heart disease (CHD), stroke, and death. Individuals with FH have a significantly increased risk for CHD, estimated to be 20 times greater than that of the general population. If untreated, men with FH have a 50% risk and women with FH have a 30% risk of having a cardiovascular event by ages 50 and 60 years, respectively. The prevalence of FH is estimated to be around 1:250  individuals in the general population, with an even higher prevalence in certain ethnic groups.     Heterozygous FH (HeFH) is inherited in an autosomal dominant manner, meaning that only one mutation is needed to cause disease. Almost all affected individuals inherit the familial mutation from an  affected parent. In addition, all children of an individual with HeFH have a 50% chance of inheriting the mutation. Homozygous FH (HoFH) is rare (prevalence 1:1,000,000) and occurs when an individual inherits two mutations. Therefore, if both parents have HeFH, each of their children would have a 25% risk of inheriting HoFH and a 50% risk of inheriting HeFH.    A diagnosis of FH is typically made on a clinical basis. There are several sets of validated diagnostic criteria available, including the [place] MEDPED Program, the [place] FH Registry, and the [place] [facility]. Genetic testing is also available to confirm the diagnosis.     Currently 4 genes have been found to be related to FH.  Genetic testing currently identifies mutations in greater than 90% of definitive FH cases and 30% of probable FH cases. Reviewed capabilities, limitations, and logistics of testing.  DNA sample via saliva or blood is collected and sent to testing lab for evaluation of selected genes. The results could directly impact care and treatment.      Explained three possible outcomes of genetic testing including: positive identification of a mutation, no mutation identified, and identification of a variant of unknown significance (VUS). If a mutation is identified, presymptomatic testing would be available to at risk family members. If no mutation is identified, it does not rule out the possibility of a genetic component to this disease. Family members could still be at risk for developing the same condition. If a VUS is identified, it is unclear if the mutation is disease causing or just a normal variation. It may take time and possibly additional testing to determine the meaning of a VUS result.     Test results take approximately 2-4 weeks on average. Discussed cost of testing through commercial labs. Explained that the lab will work with insurance to determine coverage and contact patient if out of pocket costs are expected  to exceed $100.     Discussed pros and cons of genetic testing. Explained that results could determine the cause for FH. If a mutation is identified, presymptomatic testing is available to all at risk relatives. Reviewed possible issues associated with presymptomatic testing including genetic discrimination, current laws to prevent discrimination (ie. RONNA), insurance issues, and emotional and psychosocial outcomes of testing.     Also discussed option of collecting more information from family members, including cholesterol levels, reason for brother's loop recorder, mother and cousin's arrhythmia diagnoses, and then determining best course of action.    Due to the familial risk, we also recommend that at risk family members undergo lipid or molecular analysis of cholesterol levels.     All questions answered at this time.     PLAN: Genny elected to proceed with genetic testing for FH.  She will also try to collect more information as she awaits her results.  Requisition and consent forms were completed and signed.  DNA will be collected via blood sample and sent to [facility]  laboratory. I will contact patient when results are available.    TOTAL TIME SPENT IN COUNSELIN minutes    Marlena Finch MS, [facility]  Licensed, Certified Genetic Counselor  [facility]

## 2023-01-05 ENCOUNTER — LAB (OUTPATIENT)
Dept: LAB | Facility: CLINIC | Age: 48
End: 2023-01-05
Attending: GENETIC COUNSELOR, MS
Payer: COMMERCIAL

## 2023-01-05 ENCOUNTER — OFFICE VISIT (OUTPATIENT)
Dept: PEDIATRIC CARDIOLOGY | Facility: CLINIC | Age: 48
End: 2023-01-05
Attending: GENETIC COUNSELOR, MS
Payer: COMMERCIAL

## 2023-01-05 DIAGNOSIS — E78.5 DYSLIPIDEMIA WITH ELEVATED LOW DENSITY LIPOPROTEIN (LDL) CHOLESTEROL AND ABNORMALLY LOW HIGH DENSITY LIPOPROTEIN CHOLESTEROL: Primary | ICD-10-CM

## 2023-01-05 DIAGNOSIS — E78.5 DYSLIPIDEMIA WITH ELEVATED LOW DENSITY LIPOPROTEIN (LDL) CHOLESTEROL AND ABNORMALLY LOW HIGH DENSITY LIPOPROTEIN CHOLESTEROL: ICD-10-CM

## 2023-01-05 DIAGNOSIS — Z82.49 FAMILY HISTORY OF ISCHEMIC HEART DISEASE: ICD-10-CM

## 2023-01-05 PROCEDURE — 96040 HC GENETIC COUNSELING, EACH 30 MINUTES: CPT | Performed by: GENETIC COUNSELOR, MS

## 2023-01-05 PROCEDURE — 36415 COLL VENOUS BLD VENIPUNCTURE: CPT

## 2023-01-05 NOTE — PATIENT INSTRUCTIONS
Indication for Genetic Counseling:     High cholesterol can be caused by both genetic and non-genetic factors.  There are lots of well known diet and lifestyle choices that are known to increase risk for high cholesterol.  However, in some families, there is an underlying genetic predisposition for high cholesterol. Familial hypercholesterolemia (FH) is a genetic condition characterized by abnormally high concentrations of low density lipoprotein cholesterol (LDL-C) in the blood since birth, which predisposes affected individuals to premature coronary heart disease (CHD), stroke, and death. Individuals with FH have a significantly increased risk for CHD, estimated to be 20 times greater than that of the general population. If untreated, men with FH have a 50% risk and women with FH have a 30% risk of having a cardiovascular event by ages 50 and 60 years, respectively. The prevalence of FH is estimated to be around 1:250  individuals in the general population, with an even higher prevalence in certain ethnic groups.     Inheritance:   Humans have over 20,000 genes that instruct our bodies how to function.  We have two copies of each gene because we inherit one from our mother and one from our father.  Heterozygous FH (HeFH) is inherited in an autosomal dominant manner, meaning that only one mutation is needed to cause disease. Almost all affected individuals inherit the familial mutation from an affected parent. In addition, all children of an individual with HeFH have a 50% chance of inheriting the mutation. Homozygous FH (HoFH) is rare (prevalence 1:1,000,000) and occurs when an individual inherits two mutations. Therefore, if both parents have HeFH, each of their children would have a 25% risk of inheriting HoFH and a 50% risk of inheriting HeFH.    Testing Options:   Genetic testing is available to assess a panel of genes known to cause this condition.  This test reads through the DNA (sequencing) of these genes  to look for spelling mistakes or mutations that could cause the condition.      There are three types of results you could receive from this test.     -Positive result (mutation/pathogenic variant identified) - confirms diagnosis and provides an answer to why this happened.  In addition, identifying a mutation allows family members to have testing to determine their risk.     -Negative result (mutation not identified) - no genetic changes were identified.  This does not rule out a genetic cause for the condition because not all genetic causes for this condition are known.  It is predicted that this panel will identify mutations in 30% of probable FH cases and close to 90% of definitive FH cases.   -Variant of uncertain significance (VUS) - a genetic change was identified, but there is not enough information to determine whether it is disease-causing or normal human genetic variation.     Although genetic testing may identify a mutation, it cannot provide information about the severity of symptoms or the progression of disease.  We cannot predict age of onset or severity of symptoms due to reduced penetrance and variable expressivity.    Logistics:   Genetic testing involves collecting a sample of DNA, thru blood, saliva, or cheek cells.  The sample will be sent to a laboratory to extract the DNA and sequence the genes for mutations.  The laboratory will work with your insurance company to determine the out of pocket (OOP) cost and will notify you if the OOP cost is greater than $100.  Remember to ask the lab about financial assistance pricing and self pay options ($250) as well.  Sometimes those are much lower than insurance pricing.  When testing is initiated, results take about 2-4 weeks to return. I will contact you over the phone when results are available.     Genetic Information and Nondiscrimination Act:  The Genetic Information and Nondiscrimination Act of 2008 (RONNA) is a federal law that protects  individuals from genetic discrimination in health insurance and employment. Genetic discrimination is defined as the misuse of genetic information. This law does not address potential discrimination regarding life insurance or disability insurance.      This is especially relevant for at risk individuals who are considering presymptomatic testing.    Screening Recommendations:  Recommend that first degree relatives, including parents, siblings and children, be screened regularly for cholesterol levels starting in childhood.    Resources:  The FH Foundation - thefhfoundation.org    General   American Heart Association - americanheart.org  Genetics Home Reference - ghr.nlm.nih.gov  Genetic Information and Nondiscrimination Act - ginahelp.org    Contact Information:  Marlena Finch MS  Licensed Genetic Counselor  [facility]  [facility]    Office:  [phone]  Appointments:  [phone]  Fax: [phone]  Email: [email or web address]

## 2023-01-17 LAB — SCANNED LAB RESULT: NORMAL

## 2023-01-24 ENCOUNTER — TELEPHONE (OUTPATIENT)
Dept: CARDIOLOGY | Facility: CLINIC | Age: 48
End: 2023-01-24
Payer: COMMERCIAL

## 2023-01-27 NOTE — TELEPHONE ENCOUNTER
Spoke with Genny today to review results of genetic testing. She underwent genetic testing on January 5, 2023 due to her  diagnosis of high cholesterol and the suspicion of familial hypercholesterolemia (FH).     Genetic testing for the FH panel thru [facility]  revealed that Genny does NOT carry a mutation in the 4 genes analyzed. It is predicted that this panel will identify mutations in 30% of probable FH cases and close to 90% of definitive FH cases.     Therefore, this negative result does not rule out a genetic basis for pts high cholesterol, but eliminates the option of presymptomatic testing in at risk family members, at this time.    However, since this condition could still be genetic, clinical screening is recommended in all first degree relatives (children, siblings, parents).  Clinical screening should include lipid levels to assess their current status.     Explained that with continued research and genetic knowledge the detection rate for identifying mutations may increase over time. Therefore, it is worth touching base in the years to come to learn about new testing options.     A summary letter and copy of the results will be sent to patient. All questions answered at this time.     Marlena Finch MS, [facility]  Licensed, Certified Genetic Counselor  [facility]  [facility]

## 2023-02-03 RX ORDER — BISACODYL 5 MG
TABLET, DELAYED RELEASE (ENTERIC COATED) ORAL
Qty: 4 TABLET | Refills: 0 | Status: SHIPPED | OUTPATIENT
Start: 2023-02-03 | End: 2023-04-19

## 2023-02-24 ENCOUNTER — HOSPITAL ENCOUNTER (OUTPATIENT)
Facility: CLINIC | Age: 48
Discharge: HOME | End: 2023-02-24
Attending: HOSPITALIST | Admitting: HOSPITALIST
Payer: COMMERCIAL

## 2023-02-24 VITALS
HEIGHT: 61 IN | OXYGEN SATURATION: 97 % | SYSTOLIC BLOOD PRESSURE: 115 MMHG | WEIGHT: 160 LBS | TEMPERATURE: 97.3 F | BODY MASS INDEX: 30.21 KG/M2 | HEART RATE: 80 BPM | DIASTOLIC BLOOD PRESSURE: 91 MMHG | RESPIRATION RATE: 16 BRPM

## 2023-02-24 DIAGNOSIS — Z12.11 SPECIAL SCREENING FOR MALIGNANT NEOPLASMS, COLON: Primary | ICD-10-CM

## 2023-02-24 LAB — COLONOSCOPY: NORMAL

## 2023-02-24 PROCEDURE — 250N000011 HC RX IP 250 OP 636: Performed by: HOSPITALIST

## 2023-02-24 PROCEDURE — 45385 COLONOSCOPY W/LESION REMOVAL: CPT | Performed by: HOSPITALIST

## 2023-02-24 PROCEDURE — 88305 TISSUE EXAM BY PATHOLOGIST: CPT | Mod: 26 | Performed by: PATHOLOGY

## 2023-02-24 PROCEDURE — 250N000013 HC RX MED GY IP 250 OP 250 PS 637: Performed by: HOSPITALIST

## 2023-02-24 PROCEDURE — 88305 TISSUE EXAM BY PATHOLOGIST: CPT | Mod: TC | Performed by: HOSPITALIST

## 2023-02-24 PROCEDURE — G0500 MOD SEDAT ENDO SERVICE >5YRS: HCPCS | Performed by: HOSPITALIST

## 2023-02-24 RX ORDER — ONDANSETRON 2 MG/ML
4 INJECTION INTRAMUSCULAR; INTRAVENOUS
Status: DISCONTINUED | OUTPATIENT
Start: 2023-02-24 | End: 2023-02-24 | Stop reason: HOSPADM

## 2023-02-24 RX ORDER — SIMETHICONE 40MG/0.6ML
133 SUSPENSION, DROPS(FINAL DOSAGE FORM)(ML) ORAL
Status: COMPLETED | OUTPATIENT
Start: 2023-02-24 | End: 2023-02-24

## 2023-02-24 RX ORDER — NALOXONE HYDROCHLORIDE 0.4 MG/ML
0.4 INJECTION, SOLUTION INTRAMUSCULAR; INTRAVENOUS; SUBCUTANEOUS
Status: DISCONTINUED | OUTPATIENT
Start: 2023-02-24 | End: 2023-02-24 | Stop reason: HOSPADM

## 2023-02-24 RX ORDER — FLUMAZENIL 0.1 MG/ML
0.2 INJECTION, SOLUTION INTRAVENOUS
Status: DISCONTINUED | OUTPATIENT
Start: 2023-02-24 | End: 2023-02-24 | Stop reason: HOSPADM

## 2023-02-24 RX ORDER — ONDANSETRON 2 MG/ML
4 INJECTION INTRAMUSCULAR; INTRAVENOUS EVERY 6 HOURS PRN
Status: DISCONTINUED | OUTPATIENT
Start: 2023-02-24 | End: 2023-02-24 | Stop reason: HOSPADM

## 2023-02-24 RX ORDER — NALOXONE HYDROCHLORIDE 0.4 MG/ML
0.2 INJECTION, SOLUTION INTRAMUSCULAR; INTRAVENOUS; SUBCUTANEOUS
Status: DISCONTINUED | OUTPATIENT
Start: 2023-02-24 | End: 2023-02-24 | Stop reason: HOSPADM

## 2023-02-24 RX ORDER — EPINEPHRINE 1 MG/ML
0.1 INJECTION, SOLUTION INTRAMUSCULAR; SUBCUTANEOUS
Status: DISCONTINUED | OUTPATIENT
Start: 2023-02-24 | End: 2023-02-24 | Stop reason: HOSPADM

## 2023-02-24 RX ORDER — DIPHENHYDRAMINE HYDROCHLORIDE 50 MG/ML
25-50 INJECTION INTRAMUSCULAR; INTRAVENOUS
Status: DISCONTINUED | OUTPATIENT
Start: 2023-02-24 | End: 2023-02-24 | Stop reason: HOSPADM

## 2023-02-24 RX ORDER — LIDOCAINE 40 MG/G
CREAM TOPICAL
Status: DISCONTINUED | OUTPATIENT
Start: 2023-02-24 | End: 2023-02-24 | Stop reason: HOSPADM

## 2023-02-24 RX ORDER — PROCHLORPERAZINE MALEATE 10 MG
10 TABLET ORAL EVERY 6 HOURS PRN
Status: DISCONTINUED | OUTPATIENT
Start: 2023-02-24 | End: 2023-02-24 | Stop reason: HOSPADM

## 2023-02-24 RX ORDER — ONDANSETRON 4 MG/1
4 TABLET, ORALLY DISINTEGRATING ORAL EVERY 6 HOURS PRN
Status: DISCONTINUED | OUTPATIENT
Start: 2023-02-24 | End: 2023-02-24 | Stop reason: HOSPADM

## 2023-02-24 RX ORDER — FENTANYL CITRATE 50 UG/ML
50-100 INJECTION, SOLUTION INTRAMUSCULAR; INTRAVENOUS EVERY 5 MIN PRN
Status: DISCONTINUED | OUTPATIENT
Start: 2023-02-24 | End: 2023-02-24 | Stop reason: HOSPADM

## 2023-02-24 RX ORDER — ATROPINE SULFATE 0.1 MG/ML
1 INJECTION INTRAVENOUS
Status: DISCONTINUED | OUTPATIENT
Start: 2023-02-24 | End: 2023-02-24 | Stop reason: HOSPADM

## 2023-02-24 RX ADMIN — FENTANYL CITRATE 50 MCG: 50 INJECTION, SOLUTION INTRAMUSCULAR; INTRAVENOUS at 09:38

## 2023-02-24 RX ADMIN — MIDAZOLAM 1 MG: 1 INJECTION INTRAMUSCULAR; INTRAVENOUS at 09:40

## 2023-02-24 RX ADMIN — MIDAZOLAM 2 MG: 1 INJECTION INTRAMUSCULAR; INTRAVENOUS at 09:37

## 2023-02-24 RX ADMIN — FENTANYL CITRATE 50 MCG: 50 INJECTION, SOLUTION INTRAMUSCULAR; INTRAVENOUS at 09:40

## 2023-02-24 RX ADMIN — SIMETHICONE 133 MG: 20 EMULSION ORAL at 09:45

## 2023-02-24 ASSESSMENT — ACTIVITIES OF DAILY LIVING (ADL): ADLS_ACUITY_SCORE: 33

## 2023-02-28 LAB
PATH REPORT.COMMENTS IMP SPEC: NORMAL
PATH REPORT.COMMENTS IMP SPEC: NORMAL
PATH REPORT.FINAL DX SPEC: NORMAL
PATH REPORT.GROSS SPEC: NORMAL
PATH REPORT.MICROSCOPIC SPEC OTHER STN: NORMAL
PATH REPORT.RELEVANT HX SPEC: NORMAL
PHOTO IMAGE: NORMAL

## 2023-03-01 ENCOUNTER — TRANSFERRED RECORDS (OUTPATIENT)
Dept: HEALTH INFORMATION MANAGEMENT | Facility: CLINIC | Age: 48
End: 2023-03-01

## 2023-04-12 ASSESSMENT — SLEEP AND FATIGUE QUESTIONNAIRES
HOW LIKELY ARE YOU TO NOD OFF OR FALL ASLEEP WHILE SITTING AND READING: SLIGHT CHANCE OF DOZING
HOW LIKELY ARE YOU TO NOD OFF OR FALL ASLEEP WHILE SITTING QUIETLY AFTER LUNCH WITHOUT ALCOHOL: WOULD NEVER DOZE
HOW LIKELY ARE YOU TO NOD OFF OR FALL ASLEEP WHILE LYING DOWN TO REST IN THE AFTERNOON WHEN CIRCUMSTANCES PERMIT: SLIGHT CHANCE OF DOZING
HOW LIKELY ARE YOU TO NOD OFF OR FALL ASLEEP WHEN YOU ARE A PASSENGER IN A CAR FOR AN HOUR WITHOUT A BREAK: SLIGHT CHANCE OF DOZING
HOW LIKELY ARE YOU TO NOD OFF OR FALL ASLEEP WHILE SITTING AND TALKING TO SOMEONE: WOULD NEVER DOZE
HOW LIKELY ARE YOU TO NOD OFF OR FALL ASLEEP WHILE WATCHING TV: SLIGHT CHANCE OF DOZING
HOW LIKELY ARE YOU TO NOD OFF OR FALL ASLEEP WHILE SITTING INACTIVE IN A PUBLIC PLACE: WOULD NEVER DOZE
HOW LIKELY ARE YOU TO NOD OFF OR FALL ASLEEP IN A CAR, WHILE STOPPED FOR A FEW MINUTES IN TRAFFIC: WOULD NEVER DOZE

## 2023-04-19 ENCOUNTER — TELEPHONE (OUTPATIENT)
Dept: SLEEP MEDICINE | Facility: CLINIC | Age: 48
End: 2023-04-19

## 2023-04-19 ENCOUNTER — VIRTUAL VISIT (OUTPATIENT)
Dept: SLEEP MEDICINE | Facility: CLINIC | Age: 48
End: 2023-04-19
Payer: COMMERCIAL

## 2023-04-19 VITALS — HEIGHT: 61 IN | BODY MASS INDEX: 30.21 KG/M2 | WEIGHT: 160 LBS

## 2023-04-19 DIAGNOSIS — F45.8 BRUXISM: ICD-10-CM

## 2023-04-19 DIAGNOSIS — Z82.49 FAMILY HISTORY OF ISCHEMIC HEART DISEASE: ICD-10-CM

## 2023-04-19 DIAGNOSIS — G47.9 SLEEP DISTURBANCE: Primary | ICD-10-CM

## 2023-04-19 DIAGNOSIS — R06.83 SNORES: ICD-10-CM

## 2023-04-19 DIAGNOSIS — E66.811 OBESITY (BMI 30.0-34.9): ICD-10-CM

## 2023-04-19 DIAGNOSIS — G47.52 DREAM ENACTMENT BEHAVIOR: ICD-10-CM

## 2023-04-19 DIAGNOSIS — G47.51 CONFUSIONAL AROUSALS: ICD-10-CM

## 2023-04-19 DIAGNOSIS — R06.81 WITNESSED APNEIC SPELLS: ICD-10-CM

## 2023-04-19 PROCEDURE — 99204 OFFICE O/P NEW MOD 45 MIN: CPT | Mod: VID | Performed by: INTERNAL MEDICINE

## 2023-04-19 ASSESSMENT — PAIN SCALES - GENERAL: PAINLEVEL: NO PAIN (0)

## 2023-04-19 NOTE — PROGRESS NOTES
Virtual Visit Details    Type of service:  Video Visit     Originating Location (pt. Location): Home    Distant Location (provider location):  Off-site  Platform used for Video Visit: [facility]     Outpatient Sleep Medicine Consultation:      Name: Genny Reyes MRN# [id number]   Age: 47 year old YOB: 1975     Date of Consultation: April 19, 2023  Consultation is requested by: Elizabeth Leslie, GRISEL CNP  [address] [place] [address] Elizabeth Leslie  Primary care provider: Elizabeth Leslie       Reason for Sleep Consult:     Genny Reyes is sent by Elizabeth Leslie for a sleep consultation regarding obtaining evaluation for possible sleep apnea  Patient s Reason for visit  Genny Reyes main reason for visit: The snoring bothers my  and he s started saying i have apnea too. I have night terrors. My brother who is 39 had a stroke last year and apnea was one of the potential reasons given.  Patient states problem(s) started: I have been snoring and have night terrors intermittently for years. Apnea is new but I feel not as well rested at times  Genny Reyes's goals for this visit: Resolve snoring and apnea           Assessment and Plan:      Snoring, observed apneas,  non restorative sleep, fatigue in the setting of obesity, and family history of ischemic heart disease. Possible Obstructive sleep apnea  STOP BANG score is 3 out of 8. Patient likely has sleep apnea based on clinical history of snoring, observed apneas, fatigue.  Her other risk factors for sleep apnea include include body habitus and strong positive family history for JOVITA.   HST/ Polysomnography reviewed.  Patient is interested in obtaining HST.  Orders generated in epic for HST.  If the HST is negative, we will consider obtaining in-lab sleep study which will include 4 limb EMG with RBD protocol.   Obstructive sleep apnea and consequences of untreated sleep apnea were  "reviewed.  Information provided regarding treatment options for JOVITA.  Patient reports bruxism.  A modified oral appliance is a possible option to consider if she has mild to moderate JOVITA.    Abnormal sleep-related behavior(confusional arousals, dream enactment behavior which is likely suggestive of pseudo RBD- likely due to untreated JOVITA   We also discussed briefly about RBD.  We discussed safety measures for the sleep/bed environment to prevent trauma to self/partners.  She was instructed to continue to monitor for any abnormal behaviors.  If the home sleep study shows evidence of JOVITA and patient is adequately treated for JOVITA, but the abnormal sleep-related behaviors do not improve, we will consider obtaining an  in-lab sleep study which will include 4 limb EMG with RBD protocol.    Delayed sleep phase: Recommended to follow regular sleep schedule, optimizing the duration and circadian timing of sleep and aim at obtaining at least 7 to 8 hours per night and avoid sleep deprivation     Obesity: We discussed weight management with healthy diet, and exercise.    Patient was strongly advised to avoid driving, operating any heavy machinery or other hazardous situations while drowsy or sleepy.  Patient was counseled on the importance of driving while alert, to pull over if drowsy, or nap before getting into the vehicle if sleepy.      Plan is to communicate results of sleep study via video visit per patient request in 10-14 days.     Summary Counseling:    Sleep Testing Reviewed  Obstructive Sleep Apnea Reviewed  Complications of Untreated Sleep Apnea Reviewed    Medical Decision-making:   Educational materials provided in instruction.    CC: Elizabeth Leslie      The above note was dictated using voice recognition software. Although reviewed after completion, some word and grammatical error may remain . Please contact the author for any clarifications.     \" Total time spent was 50 minutes  for this appointment on " "this date of service which include time spent before, during and after the visit for chart review, patient care, counseling and coordination of care including documentation.\"         Pawan Billy MD  [address] [id number] [phone]  Dept: [phone]             History of Present Illness:     Genny Reyes is a very pleasant 47-year-old female who presents to sleep clinic via video visit with reports of snoring, observed apneas during sleep and night terrors.    Past Sleep Evaluations: None    SLEEP-WAKE SCHEDULE:     Work/School Days: Patient goes to school/work: Yes   Usually gets into bed at 11-12  Takes patient about Not long to fall asleep  Has trouble falling asleep Rarely   Wakes up in the middle of the night Only if tge dogs or someone gets me up times.  Wakes up due to Other  She has trouble falling back asleep Rarely     It usually takes Not long to get back to sleep  Patient is usually up at 7am  Uses alarm: Yes    Weekends/Non-work Days/All Other Days:  Usually gets into bed at 12   Takes patient about Not long to fall asleep  Patient is usually up at 8-9  Uses alarm: No    She reports non restorative sleep, occasional fatigue, but denies EDS.    Sleep Need  Patient gets  6 hours sleep on average   Patient thinks she needs about 7-7.5 sleep    Genny Reyes prefers to sleep in this position(s): Side   Patient states they do the following activities in bed: Read    Naps  Patient takes a purposeful nap 0 times a week and naps are usually 20-40 minutes in duration  She feels better after a nap: Yes  She dozes off unintentionally 0 days per week  Patient has had a driving accident or near-miss due to sleepiness/drowsiness: No      SLEEP DISRUPTIONS:    Breathing/Snoring  Patient snores:Yes  Other people complain about her snoring: Yes  Patient has been told she stops breathing in her sleep:Yes " "  Gasping/choking: no  She has issues with the following: Morning mouth dryness;Stuffy nose when you wake up    Movement:  Patient gets pain, discomfort, with an urge to move:  No  It happens when she is resting:  No  It happens more at night:  No  Patient has been told she kicks her legs at night:  No     Behaviors in Sleep:  Genny Reyes has experienced the following behaviors while sleeping: Teeth grinding;Night terrors (screaming,yelling or acting afraid but not recalling event);See or hear things that are not really there upon awakening or just falling asleep   She reports night terrors for the last 15 years. Examples include: Sitting up straight in bed and saying \"who is here?\"-as if there is an intruder, screaming, on one occasion she put a pillow on her 's face that freaked her  out.  She does not have  recollection of the dream content. Sensation of smell is good.  She reports teeth grinding and does not use mouth guard  Consistently  There are no reports of sleepwalking, sleep eating, cataplexy, sleep paralysis or hallucinations.       CAFFEINE AND OTHER SUBSTANCES:    Patient consumes caffeinated beverages per day:  2  Last caffeine use is usually: Noon  List of any prescribed or over the counter stimulants that patient takes: NA  List of any prescribed or over the counter sleep medication patient takes: NA  List of previous sleep medications that patient has tried: NA  Patient drinks alcohol to help them sleep: No  Patient drinks alcohol near bedtime: No    Family History:  Patient has a family member been diagnosed with a sleep disorder: Yes  My dad snd brother use a cpap machine. My mom snores but is protesting the intervention   Sister and youngest brother sleep walk      SCALES:    EPWORTH SLEEPINESS SCALE         4/12/2023     9:17 AM    [place] Sleepiness Scale ( MARISABEL Rivera  [id number]<br>ESS - [place]/English - Final version - 21 Nov 07 - [facility] [place].)   Sitting and " reading Slight chance of dozing   Watching TV Slight chance of dozing   Sitting, inactive in a public place (e.g. a theatre or a meeting) Would never doze   As a passenger in a car for an hour without a break Slight chance of dozing   Lying down to rest in the afternoon when circumstances permit Slight chance of dozing   Sitting and talking to someone Would never doze   Sitting quietly after a lunch without alcohol Would never doze   In a car, while stopped for a few minutes in traffic Would never doze   [place] Score (MC) 4   [place] Score (Sleep) 4         INSOMNIA SEVERITY INDEX (TORSTEN)          4/12/2023     9:05 AM   Insomnia Severity Index (TORSTEN)   Difficulty falling asleep 0   Difficulty staying asleep 0   Problems waking up too early 0   How SATISFIED/DISSATISFIED are you with your CURRENT sleep pattern? 2   How NOTICEABLE to others do you think your sleep problem is in terms of impairing the quality of your life? 2   How WORRIED/DISTRESSED are you about your current sleep problem? 1   To what extent do you consider your sleep problem to INTERFERE with your daily functioning (e.g. daytime fatigue, mood, ability to function at work/daily chores, concentration, memory, mood, etc.) CURRENTLY? 2   TORSTEN Total Score 7       Guidelines for Scoring/Interpretation:  Total score categories:  0-7 = No clinically significant insomnia   8-14 = Subthreshold insomnia   15-21 = Clinical insomnia (moderate severity)  22-28 = Clinical insomnia (severe)  Used via courtesy of www.[employer].va.gov with permission from Donovan Escobar PhD., [facility]      STOP BANG score:  3/8(snoring, tiredness, observed apneas during sleep)        4/19/2023     7:46 AM   STOP BANG Questionnaire (  2008, the American Society of Anesthesiologists, Inc. [employer]   BMI Clinic: 30.23         GAD7         View : No data to display.                  CAGE-AID         View : No data to display.                CAGE-AID  "reprinted with permission from the [place] Medical Journal, JAM Garcia. and FRANCISCO J Bonner, \"Conjoint screening questionnaires for alcohol and drug abuse\" [place] Medical Journal 94: 135-140, 1995.      PATIENT HEALTH QUESTIONNAIRE-9 (PHQ - 9)         View : No data to display.                Developed by Kamini Pruitt, Dayna Holly, Bart Reilly and colleagues, with an educational ingrid from [employer] No permission required to reproduce, translate, display or distribute.        Allergies:    Allergies   Allergen Reactions     Cats      Kiwi      Penicillins      PN: LW Reaction: HIVES     Pomegranate Extract      fruit     Seasonal Allergies        Medications:    Current Outpatient Medications   Medication Sig Dispense Refill     fluticasone (FLONASE) 50 MCG/ACT nasal spray Spray 1-2 sprays into both nostrils daily 48 g 3     ZYRTEC 10 MG OR TABS 1 TABLET DAILY       albuterol (PROAIR HFA/PROVENTIL HFA/VENTOLIN HFA) 108 (90 Base) MCG/ACT inhaler Inhale 2 puffs into the lungs every 6 hours (Patient not taking: Reported on 4/19/2023) 18 g 1     bisacodyl (DULCOLAX) 5 MG EC tablet Take 2 tablets at 3 pm the day before your procedure. If your procedure is before 11 am, take 2 additional tablets at 11 pm. If your procedure is after 11 am, take 2 additional tablets at 6 am. For additional instructions refer to your colonoscopy prep instructions. 4 tablet 0     levonorgestrel (MIRENA) 20 MCG/24HR IUD 1 each (20 mcg) by Intrauterine route once for 1 dose 1 each 0     polyethylene glycol (GOLYTELY) 236 g suspension The night before the exam at 6 pm drink an 8-ounce glass every 15 minutes until the jug is half empty. If you arrive before 11 AM: Drink the other half of the Golytely jug at 11 PM night before procedure. If you arrive after 11 AM: Drink the other half of the Golytely jug at 6 AM day of procedure. For additional instructions refer to your colonoscopy prep instructions. 4000 mL 0     " Spacer/Aero-Holding Chambers (E-Z SPACER) BLESSING Use with inhaler. (Patient not taking: Reported on 2023) 1 Device 0       Problem List:  Patient Active Problem List    Diagnosis Date Noted     Family history of ischemic heart disease 2022     Priority: Medium     Brother massive stroke age 39  Sister MI at age 42       Snores 2022     Priority: Medium     Intermittent asthma, uncomplicated 2016     Priority: Medium     Dysplastic nevus 10/02/2012     Priority: Low     Dermatology consults- every 6 month visits.         CARDIOVASCULAR SCREENING; LDL GOAL LESS THAN 160 10/31/2010     Priority: Low     Total Cholesterol -242  HDL Cholesterol - 61  LDL Cholesterol - 160  Triglyderides - 103  Total Cholesterol Ratio: 3.97  Fasting Glucose - 75    2017 valuesTotal Cholesterol -242            Past Medical/Surgical History:  Past Medical History:   Diagnosis Date     Environmental allergies      Past Surgical History:   Procedure Laterality Date     CHOLECYSTECTOMY, LAPOROSCOPIC  2009     COLONOSCOPY N/A 2023    Procedure: COLONOSCOPY with polypectomy by using a cold snare;  Surgeon: Shine Tsai MD;  Location:  GI     SURGICAL HISTORY OF -   ,            Social History:  Social History     Socioeconomic History     Marital status:      Spouse name: Not on file     Number of children: Not on file     Years of education: Not on file     Highest education level: Not on file   Occupational History     Not on file   Tobacco Use     Smoking status: Never     Smokeless tobacco: Never   Vaping Use     Vaping status: Not on file   Substance and Sexual Activity     Alcohol use: Yes     Comment: Not even weekly, but occasional     Drug use: No     Sexual activity: Yes     Partners: Male     Birth control/protection: I.U.D.     Comment:    Other Topics Concern     Parent/sibling w/ CABG, MI or angioplasty before 65F 55M? No   Social History Narrative     Not  on file     Social Determinants of Health     Financial Resource Strain: Low Risk  (11/2/2022)    Overall Financial Resource Strain (CARDIA)      Difficulty of Paying Living Expenses: Not hard at all   Food Insecurity: No Food Insecurity (11/2/2022)    Hunger Vital Sign      Worried About Running Out of Food in the Last Year: Never true      Ran Out of Food in the Last Year: Never true   Transportation Needs: No Transportation Needs (11/2/2022)    PRAPARE - Transportation      Lack of Transportation (Medical): No      Lack of Transportation (Non-Medical): No   Physical Activity: Sufficiently Active (11/2/2022)    Exercise Vital Sign      Days of Exercise per Week: 4 days      Minutes of Exercise per Session: 40 min   Stress: No Stress Concern Present (11/2/2022)    [place] [employer] - Occupational Stress Questionnaire      Feeling of Stress : Not at all   Social Connections: Socially Integrated (11/2/2022)    Social Connection and Isolation Panel [NHANES]      Frequency of Communication with Friends and Family: Three times a week      Frequency of Social Gatherings with Friends and Family: Twice a week      Attends Spiritism Services: 1 to 4 times per year      Active Member of Clubs or Organizations: Yes      Attends Club or Organization Meetings: Not on file      Marital Status:    Intimate Partner Violence: Not on file   Housing Stability: Low Risk  (11/2/2022)    Housing Stability Vital Sign      Unable to Pay for Housing in the Last Year: No      Number of Places Lived in the Last Year: 1      Unstable Housing in the Last Year: No       Family History:  Family History   Problem Relation Age of Onset     Lipids Mother      Hyperlipidemia Mother      Lipids Father         skin cancer     Myocardial Infarction Sister 42     Cerebrovascular Disease Brother 39        massive stroke     Coronary Artery Disease Maternal Grandmother         CHF     Colon Cancer Paternal Grandmother       "Coronary Artery Disease Paternal Grandfather         5 heart attacks     Hypertension Paternal Grandfather      Hyperlipidemia Paternal Grandfather        Review of Systems:  A complete review of systems reviewed by me is negative with the exeption of what has been mentioned in the history of present illness.  In the last TWO WEEKS have you experienced any of the following symptoms?  Fevers: No  Night Sweats: No  Weight Gain: No  Pain at Night: No  Double Vision: No  Changes in Vision: No  Difficulty Breathing through Nose: Yes  Sore Throat in Morning: No  Dry Mouth in the Morning: Yes  Shortness of Breath Lying Flat: No  Shortness of Breath With Activity: No  Awakening with Shortness of Breath: No  Increased Cough: No  Heart Racing at Night: No  Swelling in Feet or Legs: No  Diarrhea at Night: No  Heartburn at Night: No  Urinating More than Once at Night: No  Losing Control of Urine at Night: No  Joint Pains at Night: No  Headaches in Morning: No  Weakness in Arms or Legs: No  Depressed Mood: No  Anxiety: No      Physical Examination:  Vitals: Ht 1.549 m (5' 1\")   Wt 72.6 kg (160 lb)   BMI 30.23 kg/m    BMI= Body mass index is 30.23 kg/m .    General: No apparent distress, appropriately groomed  Head: Normocephalic, atraumatic  Neck:Circumference: 14 inches  Chest: No cough, no audible wheezing, able to talk in full sentences  Psych: coherent speech, normal rate and volume, able to articulate logical thoughts, able   to abstract reason, no tangential thoughts, no hallucinations   or delusions   Her affect is normal  Neuro:  Mental status: Alert and  Oriented X 3  Speech: normal            Data: All pertinent previous laboratory data reviewed     Recent Labs   Lab Test 11/07/22  1005 09/30/15  0000     --    POTASSIUM 4.0  --    CHLORIDE 109  --    CO2 26  --    ANIONGAP 6  --    GLC 91 68*   BUN 9  --    CR 0.69  --    ANKIT 8.7  --        No results for input(s): WBC, RBC, HGB, HCT, MCV, MCH, MCHC, RDW, PLT " in the last 17687 hours.    Recent Labs   Lab Test 11/07/22  1005   PROTTOTAL 7.0   ALBUMIN 4.0   BILITOTAL 1.1   ALKPHOS 56   AST 23   ALT 40       TSH (mU/L)   Date Value   09/27/2011 2.79       No results found for: UAMP, UBARB, BENZODIAZEUR, UCANN, UCOC, OPIT, UPCP    No results found for: IRONSAT, [id number], MARCELLO    No results found for: PH, PHARTERIAL, PO2, [id number], SAT, PCO2, HCO3, BASEEXCESS, SUNSHINE, BEB    Echocardiology: No results found for this or any previous visit (from the past 4320 hour(s)).    Chest x-ray: No results found for this or any previous visit from the past 365 days.    Chest CT: No results found for this or any previous visit from the past 365 days.    PFT: Most Recent [facility] Pulmonary Function Testing    No results found for: 20001       Pawan Billy MD 4/19/2023

## 2023-04-19 NOTE — NURSING NOTE
Is the patient currently in the state of [place]? YES    Visit mode:VIDEO    If the visit is dropped, the patient can be reconnected by: VIDEO VISIT: Send to e-mail at: [email or web address]    Will anyone else be joining the visit? NO      How would you like to obtain your AVS? MyChart    Are changes needed to the allergy or medication list? NO    Reason for visit: Consult (Observed apnea, snoring and night terrors)

## 2023-04-19 NOTE — PATIENT INSTRUCTIONS
"      MY TREATMENT INFORMATION FOR SLEEP APNEA-  Genny Reyes    DOCTOR : Pawan Billy MD    Am I having a home sleep study?  --->Watch the video for the device you are using:    -/drop off device-   https://www.deltaDNA.com/watch?v=yGGFBdELGhk     Frequently asked questions:  1. What is Obstructive Sleep Apnea (JOVITA)? JOVITA is the most common type of sleep apnea. Apnea means, \"without breath.\"  Apnea is most often caused by narrowing or collapse of the upper airway as muscles relax during sleep.   Almost everyone has occasional apneas. Most people with sleep apnea have had brief interruptions at night frequently for many years.  The severity of sleep apnea is related to how frequent and severe the events are.   2. What are the consequences of JOVITA? Symptoms include: feeling sleepy during the day, snoring loudly, gasping or stopping of breathing, trouble sleeping, and occasionally morning headaches or heartburn at night.  Sleepiness can be serious and even increase the risk of falling asleep while driving. Other health consequences may include development of high blood pressure and other cardiovascular disease in persons who are susceptible. Untreated JOVITA  can contribute to heart disease, stroke and diabetes.   3. What are the treatment options? In most situations, sleep apnea is a lifelong disease that must be managed with daily therapy. Medications are not effective for sleep apnea and surgery is generally not considered until other therapies have been tried. Your treatment is your choice . Continuous Positive Airway (CPAP) works right away and is the therapy that is effective in nearly everyone. An oral device to hold your jaw forward is usually the next most reliable option. Other options include postioning devices (to keep you off your back), weight loss, and surgery including a tongue pacing device. There is more detail about some of these options below.  4. Are my sleep studies " covered by insurance? Although we will request verification of coverage, we advise you also check in advance of the study to ensure there is coverage.    Important tips for those choosing CPAP and similar devices   Know your equipment:  CPAP is continuous positive airway pressure that prevents obstructive sleep apnea by keeping the throat from collapsing while you are sleeping. In most cases, the device is  smart  and can slowly self-adjusts if your throat collapses and keeps a record every day of how well you are treated-this information is available to you and your care team.  BPAP is bilevel positive airway pressure that keeps your throat open and also assists each breath with a pressure boost to maintain adequate breathing.  Special kinds of BPAP are used in patients who have inadequate breathing from lung or heart disease. In most cases, the device is  smart  and can slowly self-adjusts to assist breathing. Like CPAP, the device keeps a record of how well you are treated.  Your mask is your connection to the device. You get to choose what feels most comfortable and the staff will help to make sure if fits. Here: are some examples of the different masks that are available:       Key points to remember on your journey with sleep apnea:  Sleep study.  PAP devices often need to be adjusted during a sleep study to show that they are effective and adjusted right.  Good tips to remember: Try wearing just the mask during a quiet time during the day so your body adapts to wearing it. A humidifier is recommended for comfort in most cases to prevent drying of your nose and throat. Allergy medication from your provider may help you if you are having nasal congestion.  Getting settled-in. It takes more than one night for most of us to get used to wearing a mask. Try wearing just the mask during a quiet time during the day so your body adapts to wearing it. A humidifier is recommended for comfort in most cases. Our team will  work with you carefully on the first day and will be in contact within 4 days and again at 2 and 4 weeks for advice and remote device adjustments. Your therapy is evaluated by the device each day.   Use it every night. The more you are able to sleep naturally for 7-8 hours, the more likely you will have good sleep and to prevent health risks or symptoms from sleep apnea. Even if you use it 4 hours it helps. Occasionally all of us are unable to use a medical therapy, in sleep apnea, it is not dangerous to miss one night.   Communicate. Call our skilled team on the number provided on the first day if your visit for problems that make it difficult to wear the device. Over 2 out of 3 patients can learn to wear the device long-term with help from our team. Remember to call our team or your sleep providers if you are unable to wear the device as we may have other solutions for those who cannot adapt to mask CPAP therapy. It is recommended that you sleep your sleep provider within the first 3 months and yearly after that if you are not having problems.   Use it for your health. We encourage use of CPAP masks during daytime quiet periods to allow your face and brain to adapt to the sensation of CPAP so that it will be a more natural sensation to awaken to at night or during naps. This can be very useful during the first few weeks or months of adapting to CPAP though it does not help medically to wear CPAP during wakefulness and  should not be used as a strategy just to meet guidelines.  Take care of your equipment. Make sure you clean your mask and tubing using directions every day and that your filter and mask are replaced as recommended or if they are not working.     BESIDES CPAP, WHAT OTHER THERAPIES ARE THERE?    Positioning Device  Positioning devices are generally used when sleep apnea is mild and only occurs on your back.This example shows a pillow that straps around the waist. It may be appropriate for those whose  sleep study shows milder sleep apnea that occurs primarily when lying flat on one's back. Preliminary studies have shown benefit but effectiveness at home may need to be verified by a home sleep test. These devices are generally not covered by medical insurance.  Examples of devices that maintain sleeping on the back to prevent snoring and mild sleep apnea.    Belt type body positioner  http://Cardback.Thumbs Up/    Electronic reminder  http://nightshifttherapy.com/            Oral Appliance  What is oral appliance therapy?  An oral appliance device fits on your teeth at night like a retainer used after having braces. The device is made by a specialized dentist and requires several visits over 1-2 months before a manufactured device is made to fit your teeth and is adjusted to prevent your sleep apnea. Once an oral device is working properly, snoring should be improved. A home sleep test may be recommended at that time if to determine whether the sleep apnea is adequately treated.       Some things to remember:  -Oral devices are often, but not always, covered by your medical insurance. Be sure to check with your insurance provider.   -If you are referred for oral therapy, you will be given a list of specialized dentists to consider or you may choose to visit the Web site of the American Academy of Dental Sleep Medicine  -Oral devices are less likely to work if you have severe sleep apnea or are extremely overweight.     More detailed information  An oral appliance is a small acrylic device that fits over the upper and lower teeth  (similar to a retainer or a mouth guard). This device slightly moves jaw forward, which moves the base of the tongue forward, opens the airway, improves breathing for effective treat snoring and obstructive sleep apnea in perhaps 7 out of 10 people .  The best working devices are custom-made by a dental device  after a mold is made of the teeth 1, 2, 3.  When is an oral appliance  indicated?  Oral appliance therapy is recommended as a first-line treatment for patients with primary snoring, mild sleep apnea, and for patients with moderate sleep apnea who prefer appliance therapy to use of CPAP4, 5. Severity of sleep apnea is determined by sleep testing and is based on the number of respiratory events per hour of sleep.   How successful is oral appliance therapy?  The success rate of oral appliance therapy in patients with mild sleep apnea is 75-80% while in patients with moderate sleep apnea it is 50-70%. The chance of success in patients with severe sleep apnea is 40-50%. The research also shows that oral appliances have a beneficial effect on the cardiovascular health of JOVITA patients at the same magnitude as CPAP therapy7.  Oral appliances should be a second-line treatment in cases of severe sleep apnea, but if not completely successful then a combination therapy utilizing CPAP plus oral appliance therapy may be effective. Oral appliances tend to be effective in a broad range of patients although studies show that the patients who have the highest success are females, younger patients, those with milder disease, and less severe obesity. 3, 6.   Finding a dentist that practices dental sleep medicine  Specific training is available through the American Academy of Dental Sleep Medicine for dentists interested in working in the field of sleep. To find a dentist who is educated in the field of sleep and the use of oral appliances, near you, visit the Web site of the American Academy of Dental Sleep Medicine.    References  1. Marily et al. Objectively measured vs self-reported compliance during oral appliance therapy for sleep-disordered breathing. Chest 2013; 144(5): 4693-6355.  2. Fadi, et al. Objective measurement of compliance during oral appliance therapy for sleep-disordered breathing. Thorax 2013; 68(1): 91-96.  3. Sabino et al. Mandibular advancement devices in 620 men and  women with JOVITA and snoring: tolerability and predictors of treatment success. Chest 2004; 125: 1176-7224.  4. Darlyn et al. Oral appliances for snoring and JOVITA: a review. Sleep 2006; 29: 244-262.  5. Brittany et al. Oral appliance treatment for JOVITA: an update. J Clin Sleep Med 2014; 10(2): 215-227.  6. Kita et al. Predictors of OSAH treatment outcome. J Dent Res 2007; 86: 7350-0032.      Weight Loss:    Weight loss is a long-term strategy that may improve sleep apnea in some patients.    Weight management is a personal decision and the decision should be based on your interest and the potential benefits.  If you are interested in exploring weight loss strategies, the following discussion covers the impact on weight loss on sleep apnea and the approaches that may be successful.    Being overweight does not necessarily mean you will have health consequences.  Those who have BMI over 35 or over 27 with existing medical conditions carries greater risk.   Weight loss decreases severity of sleep apnea in most people with obesity. For those with mild obesity who have developed snoring with weight gain, even 15-30 pound weight loss can improve and occasionally eliminate sleep apnea.  Structured and life-long dietary and health habits are necessary to lose weight and keep healthier weight levels.     Though there may be significant health benefits from weight loss, long-term weight loss is very difficult to achieve- studies show success with dietary management in less than 10% of people. In addition, substantial weight loss may require years of dietary control and may be difficult if patients have severe obesity. In these cases, surgical management may be considered.  Finally, older individuals who have tolerated obesity without health complications may be less likely to benefit from weight loss strategies.      [unfilled]    Surgery:    Surgery for obstructive sleep apnea is considered generally only when other  therapies fail to work. Surgery may be discussed with you if you are having a difficult time tolerating CPAP and or when there is an abnormal structure that requires surgical correction.  Nose and throat surgeries often enlarge the airway to prevent collapse.  Most of these surgeries create pain for 1-2 weeks and up to half of the most common surgeries are not effective throughout life.  You should carefully discuss the benefits and drawbacks to surgery with your sleep provider and surgeon to determine if it is the best solution for you.   More information  Surgery for JOVITA is directed at areas that are responsible for narrowing or complete obstruction of the airway during sleep.  There are a wide range of procedures available to enlarge and/or stabilize the airway to prevent blockage of breathing in the three major areas where it can occur: the palate, tongue, and nasal regions.  Successful surgical treatment depends on the accurate identification of the factors responsible for obstructive sleep apnea in each person.  A personalized approach is required because there is no single treatment that works well for everyone.  Because of anatomic variation, consultation with an examination by a sleep surgeon is a critical first step in determining what surgical options are best for each patient.  In some cases, examination during sedation may be recommended in order to guide the selection of procedures.  Patients will be counseled about risks and benefits as well as the typical recovery course after surgery. Surgery is typically not a cure for a person s JOVITA.  However, surgery will often significantly improve one s JOVITA severity (termed  success rate ).  Even in the absence of a cure, surgery will decrease the cardiovascular risk associated with OSA7; improve overall quality of life8 (sleepiness, functionality, sleep quality, etc).      Palate Procedures:  Patients with JOVITA often have narrowing of their airway in the region  of their tonsils and uvula.  The goals of palate procedures are to widen the airway in this region as well as to help the tissues resist collapse.  Modern palate procedure techniques focus on tissue conservation and soft tissue rearrangement, rather than tissue removal.  Often the uvula is preserved in this procedure. Residual sleep apnea is common in patient after pharyngoplasty with an average reduction in sleep apnea events of 33%2.      Tongue Procedures:  ExamWhile patients are awake, the muscles that surround the throat are active and keep this region open for breathing. These muscles relax during sleep, allowing the tongue and other structures to collapse and block breathing.  There are several different tongue procedures available.  Selection of a tongue base procedure depends on characteristics seen on physical exam.  Generally, procedures are aimed at removing bulky tissues in this area or preventing the back of the tongue from falling back during sleep.  Success rates for tongue surgery range from 50-62%3.    Hypoglossal Nerve Stimulation:  Hypoglossal nerve stimulation has recently received approval from the [place] Food and Drug Administration for the treatment of obstructive sleep apnea.  This is based on research showing that the system was safe and effective in treating sleep apnea6.  Results showed that the median AHI score decreased 68%, from 29.3 to 9.0. This therapy uses an implant system that senses breathing patterns and delivers mild stimulation to airway muscles, which keeps the airway open during sleep.  The system consists of three fully implanted components: a small generator (similar in size to a pacemaker), a breathing sensor, and a stimulation lead.  Using a small handheld remote, a patient turns the therapy on before bed and off upon awakening.    Candidates for this device must be greater than 18 years of age, have moderate to severe JOVITA (AHI between 15-65), BMI less than 35,  have tried CPAP/oral appliance for at least 8 weeks without success, and have appropriate upper airway anatomy (determined by a sleep endoscopy performed by Dr. Froilan Adkins).    Hypoglossal Nerve Stimulation Pathway:    The sleep surgeon s office will work with the patient through the insurance prior-authorization process (including communications and appeals).    Nasal Procedures:  Nasal obstruction can interfere with nasal breathing during the day and night.  Studies have shown that relief of nasal obstruction can improve the ability of some patients to tolerate positive airway pressure therapy for obstructive sleep apnea1.  Treatment options include medications such as nasal saline, topical corticosteroid and antihistamine sprays, and oral medications such as antihistamines or decongestants. Non-surgical treatments can include external nasal dilators for selected patients. If these are not successful by themselves, surgery can improve the nasal airway either alone or in combination with these other options.      Combination Procedures:  Combination of surgical procedures and other treatments may be recommended, particularly if patients have more than one area of narrowing or persistent positional disease.  The success rate of combination surgery ranges from 66-80%2,3.    References  Kamaljit PALMA. The Role of the Nose in Snoring and Obstructive Sleep Apnoea: An Update.  Eur Arch Otorhinolaryngol. 2011; 268: 1365-73.   Kings SM; Harriet JA; Alex JR; Pallanch JF; Ramila MB; Roney SG; Rylie TRIMBLE. Surgical modifications of the upper airway for obstructive sleep apnea in adults: a systematic review and meta-analysis. SLEEP 2010;33(10):5849-7599. Bakari JOYCE. Hypopharyngeal surgery in obstructive sleep apnea: an evidence-based medicine review.  Arch Otolaryngol Head Neck Surg. 2006 Feb;132(2):206-13.  Stevo YH1, Aureliano Y, Fernando LEANN. The efficacy of anatomically based multilevel surgery for obstructive sleep apnea.  Otolaryngol Head Neck Surg. 2003 Oct;129(4):327-35.  Bakari JOYCE, Goldberg A. Hypopharyngeal Surgery in Obstructive Sleep Apnea: An Evidence-Based Medicine Review. Arch Otolaryngol Head Neck Surg. 2006 Feb;132(2):206-13.  Vega WHEELER et al. Upper-Airway Stimulation for Obstructive Sleep Apnea.  N Engl J Med. 2014 Jan 9;370(2):139-49.  Campbell Y et al. Increased Incidence of Cardiovascular Disease in Middle-aged Men with Obstructive Sleep Apnea. Am J Respir Crit Care Med; 2002 166: 159-165  Rogers EM et al. Studying Life Effects and Effectiveness of Palatopharyngoplasty (SLEEP) study: Subjective Outcomes of Isolated Uvulopalatopharyngoplasty. Otolaryngol Head Neck Surg. 2011; 144: 623-631.        WHAT IF I ONLY HAVE SNORING?    Mandibular advancement devices, lateral sleep positioning, long-term weight loss and treatment of nasal allergies have been shown to improve snoring.  Exercising tongue muscles with a game (https://[employer]/[place] [employer]/gianni/soundly-reduce-snoring/vi1025591392) or stimulating the tongue during the day with a device (https://doi.org/10.3390/rum44234890) have improved snoring in some individuals.    Remember to Drive Safe... Drive Alive     Sleep health profoundly affects your health, mood, and your safety.  Thirty three percent of the population (one in three of us) is not getting enough sleep and many have a sleep disorder. Not getting enough sleep or having an untreated / undertreated sleep condition may make us sleepy without even knowing it. In fact, our driving could be dramatically impaired due to our sleep health. As your provider, here are some things I would like you to know about driving:     Here are some warning signs for impairment and dangerous drowsy driving:              -Having been awake more than 16 hours               -Looking tired               -Eyelid drooping              -Head nodding (it could be too late at this point)              -Driving for more than 30 minutes     Some  things you could do to make the driving safer if you are experiencing some drowsiness:              -Stop driving and rest              -Call for transportation              -Make sure your sleep disorder is adequately treated     Some things that have been shown NOT to work when experiencing drowsiness while driving:              -Turning on the radio              -Opening windows              -Eating any  distracting  /  entertaining  foods (e.g., sunflower seeds, candy, or any other)              -Talking on the phone      Your decision may not only impact your life, but also the life of others. Please, remember to drive safe for yourself and all of us.    Night terrors.  Please keep your sleep/bed environment safe to prevent trauma to yourself or your partner.  Here are some tips:  Keeping padding by the side of the bed   Keeping lower mattress to the floor and/or use a ground-floor bedroom  Secure doors and windows (locks, alarms, barriers)  Remove sharp and dangerous objects from bedroom, including firearms  Closely monitor for any future episodes of dream enactment behaviors.

## 2023-05-12 ASSESSMENT — SLEEP AND FATIGUE QUESTIONNAIRES
HOW LIKELY ARE YOU TO NOD OFF OR FALL ASLEEP IN A CAR, WHILE STOPPED FOR A FEW MINUTES IN TRAFFIC: WOULD NEVER DOZE
HOW LIKELY ARE YOU TO NOD OFF OR FALL ASLEEP WHILE SITTING AND TALKING TO SOMEONE: WOULD NEVER DOZE
HOW LIKELY ARE YOU TO NOD OFF OR FALL ASLEEP WHEN YOU ARE A PASSENGER IN A CAR FOR AN HOUR WITHOUT A BREAK: SLIGHT CHANCE OF DOZING
HOW LIKELY ARE YOU TO NOD OFF OR FALL ASLEEP WHILE WATCHING TV: SLIGHT CHANCE OF DOZING
HOW LIKELY ARE YOU TO NOD OFF OR FALL ASLEEP WHILE SITTING AND READING: SLIGHT CHANCE OF DOZING
HOW LIKELY ARE YOU TO NOD OFF OR FALL ASLEEP WHILE SITTING INACTIVE IN A PUBLIC PLACE: SLIGHT CHANCE OF DOZING
HOW LIKELY ARE YOU TO NOD OFF OR FALL ASLEEP WHILE SITTING QUIETLY AFTER LUNCH WITHOUT ALCOHOL: WOULD NEVER DOZE
HOW LIKELY ARE YOU TO NOD OFF OR FALL ASLEEP WHILE LYING DOWN TO REST IN THE AFTERNOON WHEN CIRCUMSTANCES PERMIT: SLIGHT CHANCE OF DOZING

## 2023-05-18 ENCOUNTER — OFFICE VISIT (OUTPATIENT)
Dept: SLEEP MEDICINE | Facility: CLINIC | Age: 48
End: 2023-05-18
Attending: INTERNAL MEDICINE
Payer: COMMERCIAL

## 2023-05-18 DIAGNOSIS — G47.9 SLEEP DISTURBANCE: ICD-10-CM

## 2023-05-18 NOTE — PROGRESS NOTES
Pt is completing a home sleep test. Pt was instructed on how to put on the Noxturnal T3 device and associated equipment before going to bed and given the opportunity to practice putting it on before leaving the sleep center. Pt was reminded to bring the home sleep test kit back to the center tomorrow, at agreed upon time for download and reporting.   Neck circumference: 35 CM / 13 3/4 inches.

## 2023-05-19 ENCOUNTER — DOCUMENTATION ONLY (OUTPATIENT)
Dept: SLEEP MEDICINE | Facility: CLINIC | Age: 48
End: 2023-05-19
Attending: INTERNAL MEDICINE
Payer: COMMERCIAL

## 2023-05-19 NOTE — PROGRESS NOTES
HST POST-STUDY QUESTIONNAIRE    1. What time did you go to bed?  12 a.m.  2. How long do you think it took to fall asleep?  5 minutes or less  3. What time did you wake up to start the day?  7 am  4. Did you get up during the night at all?  Yes  5. If you woke up, do you remember approximately what time(s)? 5 a.m.  6. Did you have any difficulty with the equipment?  No  7. Did you us any type of treatment with this study?  None  8. Was the head of the bed elevated? No  9. Did you sleep in a recliner?  No  10. Did you stop using CPAP at least 3 days before this test?  NA  11. Any other information you'd like us to know? I can sleep elevated if that is recommended.

## 2023-05-19 NOTE — PROGRESS NOTES
The HST was returned but did not record properly due to inadequate amount of adequate usable study time. Oximeter signal was only usable for about 2.5 hours. Patient will need to be called to the repeat test. Staff was notified test is a redo and charges were deleted.

## 2023-05-23 ENCOUNTER — TELEPHONE (OUTPATIENT)
Dept: SLEEP MEDICINE | Facility: CLINIC | Age: 48
End: 2023-05-23
Payer: COMMERCIAL

## 2023-05-23 NOTE — TELEPHONE ENCOUNTER
LVM for patient stating that she will need to redo the HST due to the pulse ox not being on most of the night, number was left for patient to call and schedule.  Heidi South CMA on 5/23/2023 at 1:23 PM

## 2023-06-01 ASSESSMENT — SLEEP AND FATIGUE QUESTIONNAIRES
HOW LIKELY ARE YOU TO NOD OFF OR FALL ASLEEP WHILE SITTING QUIETLY AFTER LUNCH WITHOUT ALCOHOL: WOULD NEVER DOZE
HOW LIKELY ARE YOU TO NOD OFF OR FALL ASLEEP WHILE SITTING INACTIVE IN A PUBLIC PLACE: WOULD NEVER DOZE
HOW LIKELY ARE YOU TO NOD OFF OR FALL ASLEEP WHILE SITTING AND TALKING TO SOMEONE: WOULD NEVER DOZE
HOW LIKELY ARE YOU TO NOD OFF OR FALL ASLEEP WHEN YOU ARE A PASSENGER IN A CAR FOR AN HOUR WITHOUT A BREAK: SLIGHT CHANCE OF DOZING
HOW LIKELY ARE YOU TO NOD OFF OR FALL ASLEEP IN A CAR, WHILE STOPPED FOR A FEW MINUTES IN TRAFFIC: WOULD NEVER DOZE
HOW LIKELY ARE YOU TO NOD OFF OR FALL ASLEEP WHILE WATCHING TV: SLIGHT CHANCE OF DOZING
HOW LIKELY ARE YOU TO NOD OFF OR FALL ASLEEP WHILE LYING DOWN TO REST IN THE AFTERNOON WHEN CIRCUMSTANCES PERMIT: SLIGHT CHANCE OF DOZING
HOW LIKELY ARE YOU TO NOD OFF OR FALL ASLEEP WHILE SITTING AND READING: SLIGHT CHANCE OF DOZING

## 2023-06-08 ENCOUNTER — DOCUMENTATION ONLY (OUTPATIENT)
Dept: SLEEP MEDICINE | Facility: CLINIC | Age: 48
End: 2023-06-08

## 2023-06-08 ENCOUNTER — OFFICE VISIT (OUTPATIENT)
Dept: SLEEP MEDICINE | Facility: CLINIC | Age: 48
End: 2023-06-08
Payer: COMMERCIAL

## 2023-06-08 DIAGNOSIS — G47.9 SLEEP DISTURBANCE: Primary | ICD-10-CM

## 2023-06-08 NOTE — PROGRESS NOTES
REDO hst, patient I spoke about making sure the pulse ox was securely on her finger. No further questions.

## 2023-06-09 ENCOUNTER — DOCUMENTATION ONLY (OUTPATIENT)
Dept: SLEEP MEDICINE | Facility: CLINIC | Age: 48
End: 2023-06-09
Payer: COMMERCIAL

## 2023-06-09 NOTE — PROGRESS NOTES
This HSAT was performed using a Noxturnal T3 device which recorded snore, sound, movement activity, body position, nasal pressure, oronasal thermal airflow, pulse, oximetry and both chest and abdominal respiratory effort. HSAT data was restricted to the time patient states they were in bed.     HSAT was scored using 1B 4% hypopnea rule.     HST AHI (Non-PAT): 1.5  Snoring was reported as moderate and loud.  Time with SpO2 below 89% was 0 minutes.   Overall signal quality was good.     Pt will follow up with sleep provider to determine appropriate therapy.

## 2023-06-09 NOTE — PROGRESS NOTES
HST POST-STUDY QUESTIONNAIRE    1. What time did you go to bed?  About midnight  2. How long do you think it took to fall asleep?  minutes  3. What time did you wake up to start the day?  7:30  4. Did you get up during the night at all?  Yes - not out of bed though  5. If you woke up, do you remember approximately what time(s)? no  6. Did you have any difficulty with the equipment?  No  7. Did you us any type of treatment with this study?  None  8. Was the head of the bed elevated? No  9. Did you sleep in a recliner?  No  10. Did you stop using CPAP at least 3 days before this test?  NA  11. Any other information you'd like us to know? Rough night for sleep. A coworker  Wednesday and I had a big career decision put to me yesterday so was way more restless than normal.

## 2023-06-15 NOTE — PROCEDURES
"HOME SLEEP STUDY INTERPRETATION        Patient: Genny Reyes  MRN: [id number]  YOB: 1975  Study Date: 2023  PCP/Referring Provider: Elizabeth Leslie  Ordering Provider: Pawan Billy MD       Indications for Home Study: Genny Reyes is a 47 year old female with symptoms suggestive of obstructive sleep apnea.    Estimated body mass index is 30.23 kg/m  as calculated from the following:    Height as of 23: 1.549 m (5' 1\").    Weight as of 23: 72.6 kg (160 lb).  Total score - [place]: 4 (2023  9:58 PM)  STOP-BAN/8    Data: A full night home sleep study was performed recording the standard physiologic parameters including body position, movement, sound, nasal pressure, thermal oral airflow, chest and abdominal movements with respiratory inductance plethysmography, and oxygen saturation by pulse oximetry. Pulse rate was estimated by oximetry recording. This study was considered adequate based on > 4 hours of quality oximetry and respiratory recording. As specified by the AASM Manual for the Scoring of Sleep and Associated events, version 2.3, Rule VIII.D 1B, 4% oxygen desaturation scoring for hypopneas is used as a standard of care on all home sleep apnea testing.    Analysis Time:  434 minutes    Respiration:   Sleep Associated Hypoxemia: sustained hypoxemia was not present. Average  oxygen saturation was 95.9%.  Time with saturation less than or equal to 88% was 0 minutes. The lowest oxygen saturation was 89%.   Snoring: Snoring was present.  Respiratory events: The home study revealed a presence of 5 obstructive apneas and 0 mixed and central apneas. There were 6 hypopneas resulting in a combined apnea/hypopnea index [AHI] of 1.5 events per hour.  AHI was 8.6 per hour supine, 0 per hour prone, 0.3 per hour on left side, and 0.3 per hour on right side.   Pattern: Excluding events noted above, respiratory rate and pattern was " Normal.    Position: Percent of time spent: supine - 14.4%, prone - 5.7%, on left - 39.9%, on right - 39.6%.    Heart Rate: By pulse oximetry, average pulse rate was normal at 74 bpm. The minimum pulse rate was 62 bpm and the maximum pulse rate was 110 bpm.      Assessment:     Snoring was reported, but there was no evidence of clinically significant obstructive sleep apnea.    Sleep associated hypoxemia was not present.    Recommendations:    Consider obtaining supervised in-lab sleep study to evaluate for sleep disordered breathing. Home sleep apnea testing may lack sensitivity to identify mild disease.    Suggest optimizing sleep hygiene and avoiding sleep deprivation.    Weight management.        Diagnosis Code(s): Snoring R06.83 Sleep disorder unspecified G47.9    Pawan Billy MD, Claudine 15, 2023   Diplomate, American Board of Internal Medicine, Sleep Medicine

## 2023-09-11 ENCOUNTER — TELEPHONE (OUTPATIENT)
Dept: SLEEP MEDICINE | Facility: CLINIC | Age: 48
End: 2023-09-11
Payer: COMMERCIAL

## 2023-09-11 NOTE — TELEPHONE ENCOUNTER
Patient is scheduled for a return visit on 9/18/23, left a message for patient to confirm if appointment is needed. Patient is scheduled for a psg and return visit after psg.   Tessa Sutton MA

## 2023-09-28 ENCOUNTER — TRANSFERRED RECORDS (OUTPATIENT)
Dept: HEALTH INFORMATION MANAGEMENT | Facility: CLINIC | Age: 48
End: 2023-09-28
Payer: COMMERCIAL

## 2023-10-05 ENCOUNTER — THERAPY VISIT (OUTPATIENT)
Dept: SLEEP MEDICINE | Facility: CLINIC | Age: 48
End: 2023-10-05
Attending: INTERNAL MEDICINE
Payer: COMMERCIAL

## 2023-10-05 ENCOUNTER — DOCUMENTATION ONLY (OUTPATIENT)
Dept: SLEEP MEDICINE | Facility: CLINIC | Age: 48
End: 2023-10-05

## 2023-10-05 DIAGNOSIS — G47.9 SLEEP DISTURBANCE: ICD-10-CM

## 2023-10-05 PROCEDURE — 95810 POLYSOM 6/> YRS 4/> PARAM: CPT | Performed by: INTERNAL MEDICINE

## 2023-10-09 ENCOUNTER — PATIENT OUTREACH (OUTPATIENT)
Dept: CARE COORDINATION | Facility: CLINIC | Age: 48
End: 2023-10-09
Payer: COMMERCIAL

## 2023-10-12 LAB — SLPCOMP: NORMAL

## 2023-10-12 NOTE — PROCEDURES
"         SLEEP STUDY INTERPRETATION  DIAGNOSTIC POLYSOMNOGRAPHY REPORT      Patient: NEDA MAHARAJ  YOB: 1975  Study Date: 10/5/2023  MRN: [id number]  Referring Provider: -  Ordering Provider: MD Mariajose, Evelyn    Indications for Polysomnography: The patient is a 48-year-old Female who is 5' 1\" and weighs 160.0 lbs. Her BMI is 30.6, [place] sleepiness scale 4 and neck circumference is - cm. Patient previously presented to sleep clinic with reports of snoring, observed apneas, non- restorative sleep, fatigue in the setting of obesity, and family history of ischemic heart disease. Home sleep study obtained on 6/8/2023 did not show evidence of clinically significant obstructive sleep apnea. A diagnostic polysomnogram was performed to evaluate for sleep apnea /hypoxemia.    Polysomnogram Data: A full night polysomnogram recorded the standard physiologic parameters including EEG, EOG, EMG, ECG, nasal and oral airflow. Respiratory parameters of chest and abdominal movements were recorded with respiratory inductance plethysmography. Oxygen saturation was recorded by pulse oximetry. Hypopnea scoring rule used: 1B 4%.    Sleep Architecture: Sleep architecture was remarkable for normal sleep efficiency.  All sleep stages were seen.  REM sleep was reduced.  The total recording time of the polysomnogram was 427.9 minutes. The total sleep time was 396.5 minutes. Sleep latency was normal at 16.3 minutes without the use of a sleep aid. REM latency was 97.5 minutes. Arousal index was normal at 16.0 arousals per hour. Sleep efficiency was normal at 92.7%. Wake after sleep onset was 15.0 minutes. The patient spent 4.7% of total sleep time in Stage N1, 64.1% in Stage N2, 18.5% in Stage N3, and 12.7% in REM. Time in REM supine was 42.0 minutes.    Respiration: Snoring was reported but there was no evidence of clinically significant obstructive sleep apnea based on the overall apnea-hypopnea index being less " than 5 events per hour.  The respiratory events mostly consisted of respiratory effort related arousals and were pronounced during supine sleep position.  Events ? The polysomnogram revealed a presence of 5 obstructive, 1 central, and 2 mixed apneas resulting in an apnea index of 1.2 events per hour. There were 2 obstructive hypopneas and - central hypopneas resulting in an obstructive hypopnea index of 0.3 and central hypopnea index of - events per hour. The combined apnea/hypopnea index was 1.5 events per hour (central apnea/hypopnea index was 0.2 events per hour). The REM AHI was - events per hour. The supine AHI was 2.4 events per hour. The RERA index was 7.4 events per hour.  The RDI was 8.9 events per hour.  Snoring - was reported as moderate to loud.  Respiratory rate and pattern - was notable for normal respiratory rate and pattern.  Sustained Sleep Associated Hypoventilation - Transcutaneous carbon dioxide monitoring was not used, however significant hypoventilation was not suggested by oximetry.  Sleep Associated Hypoxemia - (Greater than 5 minutes O2 sat at or below 88%) was not present. Baseline oxygen saturation was 95.7%. Lowest oxygen saturation was 92.0%. Time spent less than or equal to 88% was 0 minutes. Time spent less than or equal to 89% was 0 minutes.    Movement Activity: There were no periodic limb movements or abnormal sleep-related behaviors during the study.    Periodic Limb Activity - There were - PLMs during the entire study. The PLM index was - movements per hour. The PLM Arousal Index was - per hour.  REM EMG Activity - Excessive transient/sustained muscle activity was not present.  Nocturnal Behavior - Abnormal sleep related behaviors were not noted during/arising out of NREM / REM sleep.   Bruxism - Not apparent.    Cardiac Summary: Normal sinus rhythm was noted.  The average pulse rate was 71.0 bpm. The minimum pulse rate was 58.0 bpm while the maximum pulse rate was 102.0 bpm.   Arrhythmias were not noted.    Assessment:   Sleep architecture was remarkable for normal sleep efficiency.  All sleep stages were seen.  REM sleep was reduced.  Snoring was reported but there was no evidence of clinically significant obstructive sleep apnea based on the overall apnea-hypopnea index being less than 5 events per hour.  The respiratory events mostly consisted of respiratory effort related arousals and were pronounced during supine sleep position.  There were no periodic limb movements or abnormal sleep-related behaviors during the study.    Normal sinus rhythm was noted.      Recommendations:  Suggest the option of dental appliance through referral to Sleep Dentistry for the treatment of snoring.  Advice regarding the risks of drowsy driving.  Suggest optimizing sleep schedule and avoiding sleep deprivation.  Weight management (if BMI > 30).  Pharmacologic therapy should be used for management of restless legs syndrome only if present and clinically indicated and not based on the presence of periodic limb movements alone.    Diagnostic Codes:   Unspecified Sleep Disturbance G47.9  Snoring R06.83    10/5/2023 [facility] Study (160.0 lbs) - AHI 1.5, RDI 8.9, Supine AHI 2.4, REM AHI -, Low O2 92.0%, Time Spent ?88% 0 minutes / Time Spent ?89% 0 minutes.     _____________________________________   Electronically Signed By: (Evelyn Billy MD), 10/12/23

## 2023-10-23 ENCOUNTER — PATIENT OUTREACH (OUTPATIENT)
Dept: CARE COORDINATION | Facility: CLINIC | Age: 48
End: 2023-10-23
Payer: COMMERCIAL

## 2023-10-30 ENCOUNTER — PATIENT OUTREACH (OUTPATIENT)
Dept: CARE COORDINATION | Facility: CLINIC | Age: 48
End: 2023-10-30
Payer: COMMERCIAL

## 2023-11-01 ENCOUNTER — VIRTUAL VISIT (OUTPATIENT)
Dept: SLEEP MEDICINE | Facility: CLINIC | Age: 48
End: 2023-11-01
Payer: COMMERCIAL

## 2023-11-01 VITALS — HEIGHT: 61 IN | BODY MASS INDEX: 30.21 KG/M2 | WEIGHT: 160 LBS

## 2023-11-01 DIAGNOSIS — R06.83 SNORING: Primary | ICD-10-CM

## 2023-11-01 DIAGNOSIS — E66.811 OBESITY (BMI 30.0-34.9): ICD-10-CM

## 2023-11-01 PROCEDURE — 99213 OFFICE O/P EST LOW 20 MIN: CPT | Mod: VID | Performed by: INTERNAL MEDICINE

## 2023-11-01 ASSESSMENT — SLEEP AND FATIGUE QUESTIONNAIRES
HOW LIKELY ARE YOU TO NOD OFF OR FALL ASLEEP WHEN YOU ARE A PASSENGER IN A CAR FOR AN HOUR WITHOUT A BREAK: WOULD NEVER DOZE
HOW LIKELY ARE YOU TO NOD OFF OR FALL ASLEEP WHILE WATCHING TV: SLIGHT CHANCE OF DOZING
HOW LIKELY ARE YOU TO NOD OFF OR FALL ASLEEP WHILE SITTING INACTIVE IN A PUBLIC PLACE: WOULD NEVER DOZE
HOW LIKELY ARE YOU TO NOD OFF OR FALL ASLEEP IN A CAR, WHILE STOPPED FOR A FEW MINUTES IN TRAFFIC: WOULD NEVER DOZE
HOW LIKELY ARE YOU TO NOD OFF OR FALL ASLEEP WHILE SITTING AND TALKING TO SOMEONE: WOULD NEVER DOZE
HOW LIKELY ARE YOU TO NOD OFF OR FALL ASLEEP WHILE SITTING QUIETLY AFTER LUNCH WITHOUT ALCOHOL: SLIGHT CHANCE OF DOZING
HOW LIKELY ARE YOU TO NOD OFF OR FALL ASLEEP WHILE SITTING AND READING: SLIGHT CHANCE OF DOZING
HOW LIKELY ARE YOU TO NOD OFF OR FALL ASLEEP WHILE LYING DOWN TO REST IN THE AFTERNOON WHEN CIRCUMSTANCES PERMIT: SLIGHT CHANCE OF DOZING

## 2023-11-01 ASSESSMENT — PAIN SCALES - GENERAL: PAINLEVEL: NO PAIN (0)

## 2023-11-01 NOTE — NURSING NOTE
Is the patient currently in the state of [place]? YES    Visit mode:VIDEO    If the visit is dropped, the patient can be reconnected by: VIDEO VISIT: Send to e-mail at: [email or web address]    Will anyone else be joining the visit? NO  (If patient encounters technical issues they should call [phone]    How would you like to obtain your AVS? MyChart    Are changes needed to the allergy or medication list? No    Reason for visit: RECHECK    Dolores TEIXEIRA    Has patient had flu shot for current/most recent flu season? If so, when? Yes: 10/2023

## 2023-11-01 NOTE — PROGRESS NOTES
"Virtual Visit Details    Type of service:  Video Visit     Originating Location (pt. Location): Home    Distant Location (provider location):  Off-site  Platform used for Video Visit: [facility]  Sleep clinic follow-up visit note   Date:11/1/23     Chief complaint: Review sleep study results    Genny Reyes is a 48 year old female who previously presented to sleep clinic with symptoms suggestive of possible obstructive sleep apnea. Polysomnography was obtained on 10/5/2023  to evaluate for JOVITA.  Patient presents to sleep clinic via virtual visit today to review the test results and to discuss plan of care.     Polysomnogram report:   Study Date: 10/5/2023   Indications for Polysomnography: The patient is a 48-year-old Female who is 5' 1\" and weighs 160.0 lbs. Her BMI is 30.6, [place] sleepiness scale 4 and neck circumference is - cm. Patient previously presented to sleep clinic with reports of snoring, observed apneas, non- restorative sleep, fatigue in the setting of obesity, and family history of ischemic heart disease. Home sleep study obtained on 6/8/2023 did not show evidence of clinically significant obstructive sleep apnea. A diagnostic polysomnogram was performed to evaluate for sleep apnea /hypoxemia.     Polysomnogram Data: A full night polysomnogram recorded the standard physiologic parameters including EEG, EOG, EMG, ECG, nasal and oral airflow. Respiratory parameters of chest and abdominal movements were recorded with respiratory inductance plethysmography. Oxygen saturation was recorded by pulse oximetry. Hypopnea scoring rule used: 1B 4%.     Sleep Architecture: Sleep architecture was remarkable for normal sleep efficiency.  All sleep stages were seen.  REM sleep was reduced.  The total recording time of the polysomnogram was 427.9 minutes. The total sleep time was 396.5 minutes. Sleep latency was normal at 16.3 minutes without the use of a sleep aid. REM latency was 97.5 " minutes. Arousal index was normal at 16.0 arousals per hour. Sleep efficiency was normal at 92.7%. Wake after sleep onset was 15.0 minutes. The patient spent 4.7% of total sleep time in Stage N1, 64.1% in Stage N2, 18.5% in Stage N3, and 12.7% in REM. Time in REM supine was 42.0 minutes.     Respiration: Snoring was reported but there was no evidence of clinically significant obstructive sleep apnea based on the overall apnea-hypopnea index being less than 5 events per hour.  The respiratory events mostly consisted of respiratory effort related arousals and were pronounced during supine sleep position.  Events ? The polysomnogram revealed a presence of 5 obstructive, 1 central, and 2 mixed apneas resulting in an apnea index of 1.2 events per hour. There were 2 obstructive hypopneas and - central hypopneas resulting in an obstructive hypopnea index of 0.3 and central hypopnea index of - events per hour. The combined apnea/hypopnea index was 1.5 events per hour (central apnea/hypopnea index was 0.2 events per hour). The REM AHI was - events per hour. The supine AHI was 2.4 events per hour. The RERA index was 7.4 events per hour.  The RDI was 8.9 events per hour.  Snoring - was reported as moderate to loud.  Respiratory rate and pattern - was notable for normal respiratory rate and pattern.  Sustained Sleep Associated Hypoventilation - Transcutaneous carbon dioxide monitoring was not used, however significant hypoventilation was not suggested by oximetry.  Sleep Associated Hypoxemia - (Greater than 5 minutes O2 sat at or below 88%) was not present. Baseline oxygen saturation was 95.7%. Lowest oxygen saturation was 92.0%. Time spent less than or equal to 88% was 0 minutes. Time spent less than or equal to 89% was 0 minutes.     Movement Activity: There were no periodic limb movements or abnormal sleep-related behaviors during the study.    Periodic Limb Activity - There were - PLMs during the entire study. The PLM index  was - movements per hour. The PLM Arousal Index was - per hour.  REM EMG Activity - Excessive transient/sustained muscle activity was not present.  Nocturnal Behavior - Abnormal sleep related behaviors were not noted during/arising out of NREM / REM sleep.   Bruxism - Not apparent.     Cardiac Summary: Normal sinus rhythm was noted.  The average pulse rate was 71.0 bpm. The minimum pulse rate was 58.0 bpm while the maximum pulse rate was 102.0 bpm.  Arrhythmias were not noted.     Assessment:   Sleep architecture was remarkable for normal sleep efficiency.  All sleep stages were seen.  REM sleep was reduced.  Snoring was reported but there was no evidence of clinically significant obstructive sleep apnea based on the overall apnea-hypopnea index being less than 5 events per hour.  The respiratory events mostly consisted of respiratory effort related arousals and were pronounced during supine sleep position.  There were no periodic limb movements or abnormal sleep-related behaviors during the study.    Normal sinus rhythm was noted.       Test results were discussed with the patient in detail.        Past medical/surgical history, family history, social history, medications and allergies were reviewed.          Problem list:  Patient Active Problem List   Diagnosis    CARDIOVASCULAR SCREENING; LDL GOAL LESS THAN 160    Dysplastic nevus    Intermittent asthma, uncomplicated    Family history of ischemic heart disease    Snores            Physical Examination:   General: Pleasant. Cooperative. In no apparent distress.  Pulmonary: Able to speak in full sentences easily. No cough or wheeze.   Neurologic: Alert, oriented x3.  Psychiatric: Mood euthymic. Affect congruent with full range and intensity.     ASSESSMENT/PLAN:  Snoring, no evidence of clinically significant obstructive sleep apnea: We discussed the option of oral appliance to control snoring through referral to sleep dentistry. Patient was interested and  "referral to sleep dentistry was provided.      Obesity: We discussed weight management with healthy diet, and exercise.     Patient was instructed to return to sleep clinic in the future if any concerns with weight gain and apneic episodes, fatigue and excessive daytime sleepiness to obtain reevaluation.    Patient was strongly advised to avoid driving, operating any heavy machinery or other hazardous situations while drowsy or sleepy.  Patient was counseled on the importance of driving while alert, to pull over if drowsy, or nap before getting into the vehicle if sleepy.         The above note was dictated using voice recognition software. Although reviewed after completion, some word and grammatical error may remain . Please contact the author for any clarifications.      \" Total time spent was 22 minutes  for this appointment on this date of service which include time spent before, during and after the visit for chart review, patient care, counseling and coordination of care. Including documentation\"       Pawan Billy MD  [facility]  [address] , [place] [address]    "

## 2023-11-16 ENCOUNTER — TELEPHONE (OUTPATIENT)
Dept: SLEEP MEDICINE | Facility: CLINIC | Age: 48
End: 2023-11-16
Payer: COMMERCIAL

## 2023-11-16 NOTE — TELEPHONE ENCOUNTER
Orders from [facility] have been signed, faxed and scanned into chart.    ADAN Machuca

## 2023-11-27 ENCOUNTER — PATIENT OUTREACH (OUTPATIENT)
Dept: CARE COORDINATION | Facility: CLINIC | Age: 48
End: 2023-11-27
Payer: COMMERCIAL

## 2024-02-04 ENCOUNTER — HEALTH MAINTENANCE LETTER (OUTPATIENT)
Age: 49
End: 2024-02-04

## 2024-03-05 ENCOUNTER — TRANSFERRED RECORDS (OUTPATIENT)
Dept: HEALTH INFORMATION MANAGEMENT | Facility: CLINIC | Age: 49
End: 2024-03-05
Payer: COMMERCIAL

## 2024-06-05 ENCOUNTER — TRANSFERRED RECORDS (OUTPATIENT)
Dept: HEALTH INFORMATION MANAGEMENT | Facility: CLINIC | Age: 49
End: 2024-06-05
Payer: COMMERCIAL

## 2025-02-02 ENCOUNTER — HEALTH MAINTENANCE LETTER (OUTPATIENT)
Age: 50
End: 2025-02-02

## 2025-03-02 ENCOUNTER — HEALTH MAINTENANCE LETTER (OUTPATIENT)
Age: 50
End: 2025-03-02

## (undated) DEVICE — ENDO SNARE EXACTO COLD 9MM LOOP 2.4MMX230CM 00711115

## (undated) DEVICE — ENDO TRAP POLYP QUICK CATCH 710201

## (undated) RX ORDER — FENTANYL CITRATE 50 UG/ML
INJECTION, SOLUTION INTRAMUSCULAR; INTRAVENOUS
Status: DISPENSED
Start: 2023-02-24

## (undated) RX ORDER — SIMETHICONE 40MG/0.6ML
SUSPENSION, DROPS(FINAL DOSAGE FORM)(ML) ORAL
Status: DISPENSED
Start: 2023-02-24